# Patient Record
Sex: MALE | Race: WHITE | NOT HISPANIC OR LATINO | Employment: UNEMPLOYED | URBAN - METROPOLITAN AREA
[De-identification: names, ages, dates, MRNs, and addresses within clinical notes are randomized per-mention and may not be internally consistent; named-entity substitution may affect disease eponyms.]

---

## 2018-10-09 ENCOUNTER — APPOINTMENT (OUTPATIENT)
Dept: RADIOLOGY | Facility: MEDICAL CENTER | Age: 38
End: 2018-10-09
Attending: EMERGENCY MEDICINE

## 2018-10-09 ENCOUNTER — HOSPITAL ENCOUNTER (EMERGENCY)
Facility: MEDICAL CENTER | Age: 38
End: 2018-10-09
Attending: EMERGENCY MEDICINE

## 2018-10-09 VITALS
RESPIRATION RATE: 18 BRPM | OXYGEN SATURATION: 96 % | WEIGHT: 218.48 LBS | HEIGHT: 70 IN | DIASTOLIC BLOOD PRESSURE: 98 MMHG | SYSTOLIC BLOOD PRESSURE: 138 MMHG | BODY MASS INDEX: 31.28 KG/M2 | HEART RATE: 94 BPM | TEMPERATURE: 99.1 F

## 2018-10-09 DIAGNOSIS — S62.639A CLOSED FRACTURE OF TUFT OF DISTAL PHALANX OF FINGER: ICD-10-CM

## 2018-10-09 PROCEDURE — 73140 X-RAY EXAM OF FINGER(S): CPT | Mod: RT

## 2018-10-09 PROCEDURE — 99284 EMERGENCY DEPT VISIT MOD MDM: CPT

## 2018-10-09 PROCEDURE — 700102 HCHG RX REV CODE 250 W/ 637 OVERRIDE(OP): Performed by: EMERGENCY MEDICINE

## 2018-10-09 PROCEDURE — A9270 NON-COVERED ITEM OR SERVICE: HCPCS | Performed by: EMERGENCY MEDICINE

## 2018-10-09 RX ORDER — IBUPROFEN 600 MG/1
600 TABLET ORAL ONCE
Status: COMPLETED | OUTPATIENT
Start: 2018-10-09 | End: 2018-10-09

## 2018-10-09 RX ORDER — TRAMADOL HYDROCHLORIDE 50 MG/1
100 TABLET ORAL ONCE
Status: COMPLETED | OUTPATIENT
Start: 2018-10-09 | End: 2018-10-09

## 2018-10-09 RX ADMIN — TRAMADOL HYDROCHLORIDE 100 MG: 50 TABLET, COATED ORAL at 17:22

## 2018-10-09 RX ADMIN — IBUPROFEN 600 MG: 600 TABLET ORAL at 16:16

## 2018-10-09 ASSESSMENT — PAIN SCALES - GENERAL: PAINLEVEL_OUTOF10: 10

## 2018-10-09 NOTE — ED PROVIDER NOTES
ED Provider Note    CHIEF COMPLAINT   Chief Complaint   Patient presents with   • Digit Pain       HPI   Colt Correa is a 37 y.o. male who presents to the ED secondary to right middle finger pain.  The patient states he was using a crowbar, the crowbar went loose, he slammed the distal phalanx on his right middle finger.  He is right-handed, he does have a chronic burning of his right third and fourth distal fingers that he considers neuropathy but has not followed up with it.  Patient has sharp severe pain throughout the area.    Patient is also complaining of multiple other symptoms, increasing fatigue, malaise, thrush, oropharynx pain, burning in his mouth, neuropathy, his blood sugar was checked yesterday and was normal.  He does not have a primary care physician.    REVIEW OF SYSTEMS   See HPI for further details.     PAST MEDICAL HISTORY   Past Medical History:   Diagnosis Date   • ASTHMA    • GERD (gastroesophageal reflux disease)    • Methamphetamine abuse (HCC) 3/16/2013   • Tobacco use        FAMILY HISTORY  Family History   Problem Relation Age of Onset   • Heart Disease Other         early heart disease in his 30s   • Heart Disease Father    • Heart Disease Paternal Uncle          of early heart disease in his 30s       SOCIAL HISTORY  Social History     Social History   • Marital status: Single     Spouse name: N/A   • Number of children: N/A   • Years of education: N/A     Social History Main Topics   • Smoking status: Current Every Day Smoker     Packs/day: 0.50     Types: Cigarettes     Last attempt to quit: 2016   • Smokeless tobacco: Never Used   • Alcohol use Yes      Comment: daily 1-2 drinks   • Drug use: No      Comment: for about 5 years having quit in    • Sexual activity: Not on file     Other Topics Concern   • Not on file     Social History Narrative   • No narrative on file       SURGICAL HISTORY  Past Surgical History:   Procedure Laterality Date   • OTHER  "(COMMENTS)      sinus surgery   • TONSILLECTOMY AND ADENOIDECTOMY         CURRENT MEDICATIONS   Home Medications    **Home medications have not yet been reviewed for this encounter**         ALLERGIES   Allergies   Allergen Reactions   • Percocet [Oxycodone-Acetaminophen] Hives   • Benadryl [Altaryl]        PHYSICAL EXAM  VITAL SIGNS: /98   Pulse 94   Temp 37.3 °C (99.1 °F)   Resp 18   Ht 1.778 m (5' 10\")   Wt 99.1 kg (218 lb 7.6 oz)   SpO2 96%   BMI 31.35 kg/m²   Constitutional: Well developed, Well nourished, mild to moderate distress, Non-toxic appearance.   HENT:  Atraumatic, Normocephalic, Oral pharynx with moist mucous membranes.  Slight irritation along the tongue but no thrush, no oral pharyngeal lesions seen  Eyes: EOMI, PERRL  Cardiovascular: Good Pulses  Thorax & Lungs: No respiratory distress    Skin: Warm, Dry, No erythema, No rash.   Musculoskeletal: Patient has soft tissue swelling and ecchymosis over the pad of the right middle finger, decreased range of motion of the DIP joint, normal sensation, 2+ pulse.  Neurologic: Alert & oriented x 3,       RADIOLOGY/PROCEDURES  DX-FINGER(S) 2+ RIGHT   Final Result      Mildly displaced oblique fracture of RIGHT 3rd tuft, acute to subacute.            COURSE & MEDICAL DECISION MAKING  Pertinent Labs & Imaging studies reviewed. (See chart for details)  Patient smashed his finger will give the patient ibuprofen, check a x-ray.  Patient also has multiple other complaints, I offered to check some basic laboratory tests, the patient denies this at this point time, he wants an HIV test and thyroid tests.  Discussed with him the cost of this here in the emergency department for his chronic medical complaints, discussed with him he can go to the health department for an HIV test, recommend he follow-up with [facility] for further testing.    X-ray shows an oblique fracture, give the patient ibuprofen, the patient was adamant to get " additional pain medicines, I offered to perform a digital block on the patient.  Give the patient 2 tramadol here, he got upset by that, abdomen, long conversation with the patient the nurses had multiple conversations ultimately the patient was very upset that we did not give the patient narcotic pain medicines, reviewed the patient's medical record he does have a his admit history of methamphetamine abuse therefore I do not believe it is appropriate for the patient to receive narcotics.  Refer the patient to orthopedics, discussed with him to wear a splint, return with any concerns.        FINAL IMPRESSION  1. Closed fracture of tuft of distal phalanx of finger        Patient referred to primary care provider for blood pressure management     This dictation was created using voice recognition software. The accuracy of the dictation is limited to the abilities of the software. I expect there may be some errors of grammar and possibly content. The nursing notes were reviewed and certain aspects of this information were incorporated into this note.    Electronically signed by: Rj Alfredo, 10/9/2018 4:21 PM

## 2018-10-10 NOTE — ED NOTES
"This pt was encouraged to use the bathroom to clean his dirty hand of grease before the splint is applied. He was upset and asking for pain meds during this process. RN agreed to ask the ERP who did agree to give this pt one dose Prior To discharge. He was medicated per order. This pt continued to complain and asked if \"he was a woos needing pain meds for a broken finger\". He was adamant that he speak with the ERP to ask him why he can't have pain meds. Dr. Alfredo did speak with pt about a script for pain med. When he was not given this he did sign his paperwork and left refusing to put on splint. He marched out angry. He would not allow discharge vitals either. He used the F word several times to the RN and upon leaving the department. Security was close.  "

## 2018-10-10 NOTE — ED NOTES
Went to put splint on pt. Pt states that he doesn't think he will be able to tolerate the splint application without pain medicine. MD notified. MD states that pt can take tylenol and motrin at home. He will not be getting pain medications here and that if pt is apprehensive about the splint application, he can apply it at home. Fast triage nurse notified of this.

## 2019-07-26 ENCOUNTER — APPOINTMENT (OUTPATIENT)
Dept: RADIOLOGY | Facility: IMAGING CENTER | Age: 39
End: 2019-07-26
Attending: NURSE PRACTITIONER

## 2019-07-26 ENCOUNTER — OFFICE VISIT (OUTPATIENT)
Dept: OCCUPATIONAL MEDICINE | Facility: CLINIC | Age: 39
End: 2019-07-26

## 2019-07-26 ENCOUNTER — NON-PROVIDER VISIT (OUTPATIENT)
Dept: OCCUPATIONAL MEDICINE | Facility: CLINIC | Age: 39
End: 2019-07-26

## 2019-07-26 DIAGNOSIS — Z02.1 PRE-EMPLOYMENT HEALTH SCREENING EXAMINATION: ICD-10-CM

## 2019-07-26 PROCEDURE — 71045 X-RAY EXAM CHEST 1 VIEW: CPT | Mod: TC | Performed by: NURSE PRACTITIONER

## 2019-07-26 PROCEDURE — 92552 PURE TONE AUDIOMETRY AIR: CPT | Performed by: NURSE PRACTITIONER

## 2019-07-26 PROCEDURE — 8916 PR CLEARANCE ONLY: Performed by: NURSE PRACTITIONER

## 2019-07-26 PROCEDURE — 8915 PR COMPREHENSIVE PHYSICAL: Performed by: NURSE PRACTITIONER

## 2019-07-26 PROCEDURE — 8907 PR URINE COLLECT ONLY: Performed by: NURSE PRACTITIONER

## 2019-07-26 PROCEDURE — 8912 PR VISION SCREENING: Performed by: NURSE PRACTITIONER

## 2019-11-16 ENCOUNTER — APPOINTMENT (OUTPATIENT)
Dept: RADIOLOGY | Facility: MEDICAL CENTER | Age: 39
End: 2019-11-16
Attending: EMERGENCY MEDICINE

## 2019-11-16 ENCOUNTER — HOSPITAL ENCOUNTER (EMERGENCY)
Facility: MEDICAL CENTER | Age: 39
End: 2019-11-16
Attending: EMERGENCY MEDICINE

## 2019-11-16 VITALS
HEART RATE: 72 BPM | SYSTOLIC BLOOD PRESSURE: 154 MMHG | OXYGEN SATURATION: 99 % | WEIGHT: 220.46 LBS | RESPIRATION RATE: 18 BRPM | DIASTOLIC BLOOD PRESSURE: 111 MMHG | HEIGHT: 70 IN | BODY MASS INDEX: 31.56 KG/M2 | TEMPERATURE: 97.5 F

## 2019-11-16 LAB
ALBUMIN SERPL BCP-MCNC: 4.6 G/DL (ref 3.2–4.9)
ALBUMIN/GLOB SERPL: 1.9 G/DL
ALP SERPL-CCNC: 63 U/L (ref 30–99)
ALT SERPL-CCNC: 21 U/L (ref 2–50)
AMPHET UR QL SCN: POSITIVE
ANION GAP SERPL CALC-SCNC: 9 MMOL/L (ref 0–11.9)
APPEARANCE UR: ABNORMAL
AST SERPL-CCNC: 21 U/L (ref 12–45)
BACTERIA #/AREA URNS HPF: NEGATIVE /HPF
BARBITURATES UR QL SCN: NEGATIVE
BASOPHILS # BLD AUTO: 0.6 % (ref 0–1.8)
BASOPHILS # BLD: 0.08 K/UL (ref 0–0.12)
BENZODIAZ UR QL SCN: NEGATIVE
BILIRUB SERPL-MCNC: 1 MG/DL (ref 0.1–1.5)
BILIRUB UR QL STRIP.AUTO: NEGATIVE
BUN SERPL-MCNC: 18 MG/DL (ref 8–22)
BZE UR QL SCN: NEGATIVE
CALCIUM SERPL-MCNC: 9.5 MG/DL (ref 8.5–10.5)
CANNABINOIDS UR QL SCN: NEGATIVE
CHLORIDE SERPL-SCNC: 106 MMOL/L (ref 96–112)
CO2 SERPL-SCNC: 24 MMOL/L (ref 20–33)
COLOR UR: YELLOW
CREAT SERPL-MCNC: 0.9 MG/DL (ref 0.5–1.4)
EOSINOPHIL # BLD AUTO: 0.33 K/UL (ref 0–0.51)
EOSINOPHIL NFR BLD: 2.6 % (ref 0–6.9)
EPI CELLS #/AREA URNS HPF: NEGATIVE /HPF
ERYTHROCYTE [DISTWIDTH] IN BLOOD BY AUTOMATED COUNT: 42.2 FL (ref 35.9–50)
ETHANOL BLD-MCNC: 0 G/DL
GLOBULIN SER CALC-MCNC: 2.4 G/DL (ref 1.9–3.5)
GLUCOSE SERPL-MCNC: 92 MG/DL (ref 65–99)
GLUCOSE UR STRIP.AUTO-MCNC: NEGATIVE MG/DL
HCT VFR BLD AUTO: 44.6 % (ref 42–52)
HGB BLD-MCNC: 15 G/DL (ref 14–18)
HYALINE CASTS #/AREA URNS LPF: ABNORMAL /LPF
IMM GRANULOCYTES # BLD AUTO: 0.05 K/UL (ref 0–0.11)
IMM GRANULOCYTES NFR BLD AUTO: 0.4 % (ref 0–0.9)
KETONES UR STRIP.AUTO-MCNC: NEGATIVE MG/DL
LEUKOCYTE ESTERASE UR QL STRIP.AUTO: NEGATIVE
LIPASE SERPL-CCNC: 22 U/L (ref 11–82)
LYMPHOCYTES # BLD AUTO: 3.13 K/UL (ref 1–4.8)
LYMPHOCYTES NFR BLD: 24.6 % (ref 22–41)
MCH RBC QN AUTO: 32.1 PG (ref 27–33)
MCHC RBC AUTO-ENTMCNC: 33.6 G/DL (ref 33.7–35.3)
MCV RBC AUTO: 95.5 FL (ref 81.4–97.8)
METHADONE UR QL SCN: NEGATIVE
MICRO URNS: ABNORMAL
MONOCYTES # BLD AUTO: 0.81 K/UL (ref 0–0.85)
MONOCYTES NFR BLD AUTO: 6.4 % (ref 0–13.4)
NEUTROPHILS # BLD AUTO: 8.33 K/UL (ref 1.82–7.42)
NEUTROPHILS NFR BLD: 65.4 % (ref 44–72)
NITRITE UR QL STRIP.AUTO: NEGATIVE
NRBC # BLD AUTO: 0 K/UL
NRBC BLD-RTO: 0 /100 WBC
OPIATES UR QL SCN: NEGATIVE
OXYCODONE UR QL SCN: NEGATIVE
PCP UR QL SCN: NEGATIVE
PH UR STRIP.AUTO: 7.5 [PH] (ref 5–8)
PLATELET # BLD AUTO: 269 K/UL (ref 164–446)
PMV BLD AUTO: 10.3 FL (ref 9–12.9)
POTASSIUM SERPL-SCNC: 4.1 MMOL/L (ref 3.6–5.5)
PROPOXYPH UR QL SCN: NEGATIVE
PROT SERPL-MCNC: 7 G/DL (ref 6–8.2)
PROT UR QL STRIP: NEGATIVE MG/DL
RBC # BLD AUTO: 4.67 M/UL (ref 4.7–6.1)
RBC # URNS HPF: ABNORMAL /HPF
RBC UR QL AUTO: NEGATIVE
SODIUM SERPL-SCNC: 139 MMOL/L (ref 135–145)
SP GR UR STRIP.AUTO: 1.02
UROBILINOGEN UR STRIP.AUTO-MCNC: 0.2 MG/DL
WBC # BLD AUTO: 12.7 K/UL (ref 4.8–10.8)
WBC #/AREA URNS HPF: ABNORMAL /HPF

## 2019-11-16 PROCEDURE — 96374 THER/PROPH/DIAG INJ IV PUSH: CPT

## 2019-11-16 PROCEDURE — 85025 COMPLETE CBC W/AUTO DIFF WBC: CPT

## 2019-11-16 PROCEDURE — 80053 COMPREHEN METABOLIC PANEL: CPT

## 2019-11-16 PROCEDURE — 83690 ASSAY OF LIPASE: CPT

## 2019-11-16 PROCEDURE — 700111 HCHG RX REV CODE 636 W/ 250 OVERRIDE (IP): Performed by: EMERGENCY MEDICINE

## 2019-11-16 PROCEDURE — 99284 EMERGENCY DEPT VISIT MOD MDM: CPT

## 2019-11-16 PROCEDURE — 96375 TX/PRO/DX INJ NEW DRUG ADDON: CPT

## 2019-11-16 PROCEDURE — 81001 URINALYSIS AUTO W/SCOPE: CPT

## 2019-11-16 PROCEDURE — 80307 DRUG TEST PRSMV CHEM ANLYZR: CPT

## 2019-11-16 RX ORDER — ONDANSETRON 2 MG/ML
4 INJECTION INTRAMUSCULAR; INTRAVENOUS ONCE
Status: COMPLETED | OUTPATIENT
Start: 2019-11-16 | End: 2019-11-16

## 2019-11-16 RX ORDER — KETOROLAC TROMETHAMINE 30 MG/ML
30 INJECTION, SOLUTION INTRAMUSCULAR; INTRAVENOUS ONCE
Status: COMPLETED | OUTPATIENT
Start: 2019-11-16 | End: 2019-11-16

## 2019-11-16 RX ADMIN — KETOROLAC TROMETHAMINE 30 MG: 30 INJECTION, SOLUTION INTRAMUSCULAR at 10:59

## 2019-11-16 RX ADMIN — ONDANSETRON 4 MG: 2 INJECTION INTRAMUSCULAR; INTRAVENOUS at 11:00

## 2019-11-16 NOTE — ED NOTES
"Went to get pt for ct approximately 1 hour ago. Pt kept saying \"give me a moment\". Waited for 5 minutes. Pt continued to say the same thing. Told pt I would return. Upon return, pt was not in room. RN notified   "
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
## 2019-11-16 NOTE — ED NOTES
"Pt refused medications at this time - states \"I need 10 minutes of privacy\". Suggested medications and pt states \"not right now I just need 10 minutes\"  "

## 2020-07-11 ENCOUNTER — HOSPITAL ENCOUNTER (EMERGENCY)
Dept: HOSPITAL 8 - ED | Age: 40
Discharge: HOME | End: 2020-07-11
Payer: MEDICAID

## 2020-07-11 VITALS — DIASTOLIC BLOOD PRESSURE: 91 MMHG | SYSTOLIC BLOOD PRESSURE: 150 MMHG

## 2020-07-11 VITALS — WEIGHT: 220.46 LBS | BODY MASS INDEX: 31.56 KG/M2 | HEIGHT: 70 IN
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
## 2020-07-11 DIAGNOSIS — F17.210: ICD-10-CM
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
## 2020-07-11 DIAGNOSIS — S86.112A: Primary | ICD-10-CM

## 2020-07-11 LAB
ALBUMIN SERPL-MCNC: 3.6 G/DL (ref 3.4–5)
ANION GAP SERPL CALC-SCNC: 5 MMOL/L (ref 5–15)
BASOPHILS # BLD AUTO: 0.05 X10^3/UL (ref 0–0.1)
BASOPHILS NFR BLD AUTO: 0 % (ref 0–1)
CALCIUM SERPL-MCNC: 8.8 MG/DL (ref 8.5–10.1)
CHLORIDE SERPL-SCNC: 112 MMOL/L (ref 98–107)
CREAT SERPL-MCNC: 1.13 MG/DL (ref 0.7–1.3)
EOSINOPHIL # BLD AUTO: 0.62 X10^3/UL (ref 0–0.4)
EOSINOPHIL NFR BLD AUTO: 5 % (ref 1–7)
ERYTHROCYTE [DISTWIDTH] IN BLOOD BY AUTOMATED COUNT: 12.4 % (ref 9.4–14.8)
LYMPHOCYTES # BLD AUTO: 4.4 X10^3/UL (ref 1–3.4)
LYMPHOCYTES NFR BLD AUTO: 35 % (ref 22–44)
MCH RBC QN AUTO: 31.3 PG (ref 27.5–34.5)
MCHC RBC AUTO-ENTMCNC: 33.9 G/DL (ref 33.2–36.2)
MCV RBC AUTO: 92.5 FL (ref 81–97)
MD: NO
MONOCYTES # BLD AUTO: 0.88 X10^3/UL (ref 0.2–0.8)
MONOCYTES NFR BLD AUTO: 7 % (ref 2–9)
NEUTROPHILS # BLD AUTO: 6.48 X10^3/UL (ref 1.8–6.8)
NEUTROPHILS NFR BLD AUTO: 52 % (ref 42–75)
PLATELET # BLD AUTO: 333 X10^3/UL (ref 130–400)
PMV BLD AUTO: 8.3 FL (ref 7.4–10.4)
RBC # BLD AUTO: 4.33 X10^6/UL (ref 4.38–5.82)

## 2020-07-11 PROCEDURE — 82040 ASSAY OF SERUM ALBUMIN: CPT

## 2020-07-11 PROCEDURE — 80048 BASIC METABOLIC PNL TOTAL CA: CPT

## 2020-07-11 PROCEDURE — 99406 BEHAV CHNG SMOKING 3-10 MIN: CPT

## 2020-07-11 PROCEDURE — 36415 COLL VENOUS BLD VENIPUNCTURE: CPT

## 2020-07-11 PROCEDURE — 85025 COMPLETE CBC W/AUTO DIFF WBC: CPT

## 2020-07-11 PROCEDURE — 99284 EMERGENCY DEPT VISIT MOD MDM: CPT
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
## 2021-03-12 ENCOUNTER — TELEPHONE (OUTPATIENT)
Dept: HEALTH INFORMATION MANAGEMENT | Facility: OTHER | Age: 41
End: 2021-03-12

## 2021-04-20 ENCOUNTER — OFFICE VISIT (OUTPATIENT)
Dept: MEDICAL GROUP | Facility: MEDICAL CENTER | Age: 41
End: 2021-04-20
Attending: PHYSICIAN ASSISTANT
Payer: MEDICAID

## 2021-04-20 VITALS
RESPIRATION RATE: 16 BRPM | WEIGHT: 216.1 LBS | DIASTOLIC BLOOD PRESSURE: 58 MMHG | HEIGHT: 70 IN | HEART RATE: 78 BPM | BODY MASS INDEX: 30.94 KG/M2 | TEMPERATURE: 98.5 F | OXYGEN SATURATION: 97 % | SYSTOLIC BLOOD PRESSURE: 106 MMHG

## 2021-04-20 DIAGNOSIS — F15.11 METHAMPHETAMINE ABUSE IN REMISSION (HCC): ICD-10-CM

## 2021-04-20 DIAGNOSIS — K62.89 ANAL SPHINCTER INCOMPETENCE: ICD-10-CM

## 2021-04-20 DIAGNOSIS — D22.9 MULTIPLE ATYPICAL SKIN MOLES: ICD-10-CM

## 2021-04-20 DIAGNOSIS — Z00.00 HEALTHCARE MAINTENANCE: ICD-10-CM

## 2021-04-20 DIAGNOSIS — H91.91 HEARING LOSS OF RIGHT EAR, UNSPECIFIED HEARING LOSS TYPE: ICD-10-CM

## 2021-04-20 DIAGNOSIS — R07.89 OTHER CHEST PAIN: ICD-10-CM

## 2021-04-20 PROCEDURE — 99204 OFFICE O/P NEW MOD 45 MIN: CPT | Performed by: PHYSICIAN ASSISTANT

## 2021-04-20 PROCEDURE — 99203 OFFICE O/P NEW LOW 30 MIN: CPT | Performed by: PHYSICIAN ASSISTANT

## 2021-04-20 PROCEDURE — 99213 OFFICE O/P EST LOW 20 MIN: CPT | Performed by: PHYSICIAN ASSISTANT

## 2021-04-20 ASSESSMENT — FIBROSIS 4 INDEX: FIB4 SCORE: 0.7

## 2021-04-20 ASSESSMENT — PATIENT HEALTH QUESTIONNAIRE - PHQ9: CLINICAL INTERPRETATION OF PHQ2 SCORE: 0

## 2021-04-20 NOTE — ASSESSMENT & PLAN NOTE
Colt presents today establish care.  He reports a chronic history of chest pain that occurs at least 2 times per week. The pain is made worse with exercise or riding his bike. He has noticed associated mild bilateral lower leg edema. He denies any pain, sensory or motor function loss associated with this. He is a former methamphetamine user (for the past 12-14 years) and is concerned in regards to his heart health after many years of alcohol use and substance abuse. He denies any other symptoms associated with this, including SOB. Denies any red flag signs.

## 2021-04-20 NOTE — ASSESSMENT & PLAN NOTE
Colt presents today to establish care. He reports a history of alcohol abuse and methamphetamine use for 12-14 years. He has been in remission for 100 days now. He reports that he attends AA meetings twice daily which has been beneficial for him.
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
## 2021-04-21 ENCOUNTER — TELEPHONE (OUTPATIENT)
Dept: CARDIOLOGY | Facility: MEDICAL CENTER | Age: 41
End: 2021-04-21

## 2021-04-21 NOTE — TELEPHONE ENCOUNTER
Called patient to see if he has seen a cardiologist since seeing [facility] in 2016 to get records prior to upcoming appointment with [facility].     He denies any cardiac testing done outside of [facility]. Confirmed appointment date and time.

## 2021-05-03 ENCOUNTER — OFFICE VISIT (OUTPATIENT)
Dept: CARDIOLOGY | Facility: MEDICAL CENTER | Age: 41
End: 2021-05-03
Attending: PHYSICIAN ASSISTANT
Payer: MEDICAID

## 2021-05-03 VITALS
OXYGEN SATURATION: 97 % | WEIGHT: 215 LBS | HEART RATE: 68 BPM | HEIGHT: 70 IN | RESPIRATION RATE: 12 BRPM | DIASTOLIC BLOOD PRESSURE: 72 MMHG | BODY MASS INDEX: 30.78 KG/M2 | SYSTOLIC BLOOD PRESSURE: 108 MMHG

## 2021-05-03 DIAGNOSIS — Z72.0 TOBACCO USE: ICD-10-CM

## 2021-05-03 DIAGNOSIS — F15.11 METHAMPHETAMINE ABUSE IN REMISSION (HCC): ICD-10-CM

## 2021-05-03 DIAGNOSIS — R07.2 PRECORDIAL PAIN: ICD-10-CM

## 2021-05-03 DIAGNOSIS — Z82.49 FAMILY HISTORY OF CORONARY ARTERY DISEASE: ICD-10-CM

## 2021-05-03 LAB — EKG IMPRESSION: NORMAL

## 2021-05-03 PROCEDURE — 99406 BEHAV CHNG SMOKING 3-10 MIN: CPT | Performed by: INTERNAL MEDICINE

## 2021-05-03 PROCEDURE — 99204 OFFICE O/P NEW MOD 45 MIN: CPT | Mod: 25 | Performed by: INTERNAL MEDICINE

## 2021-05-03 PROCEDURE — 93000 ELECTROCARDIOGRAM COMPLETE: CPT | Performed by: INTERNAL MEDICINE

## 2021-05-03 ASSESSMENT — FIBROSIS 4 INDEX: FIB4 SCORE: 0.7

## 2021-05-03 NOTE — PROGRESS NOTES
"CARDIOLOGY OUTPATIENT FOLLOWUP    PCP: Una Geller P.A.-C.    1. Precordial pain    2. 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 Tobacco use    4. Family history of coronary artery disease        Colt Correa has typical chest pain limited to high level exertion as well as history of substance abuse.  I recommended an echocardiogram, stress test, laboratory evaluation as well as counseled him for greater than 3 minutes on tobacco cessation-which he is receptive to.    Follow up: to be determined after testing is complete    Chief Complaint   Patient presents with   • Chest Pain     F/V Dx: Other chest pain       History: Colt Correa is a 40 y.o. male with past medical history of methamphetamine and alcohol abuse as well as tobacco use sober for greater than the past 100 days.  He presents today for evaluation of 3 months of chest discomfort upon exertion.  He notices the sensation in the center of the chest during heavy mountain bike activities.  He has taken a break from the activity in favor of walking.  He has lost 10 pounds and hopes to get back on the mountain bike but is concerned about the cardiovascular safety.  He has numerous second-degree relatives with early coronary disease in their 30s and 40s.  He smokes cigarettes but is making efforts to quit.  We discussed his  journey to remain sober over the past 100+ days as well as the strength t/motivation hat he gets from his bert as well as his 4 children.  He continues to attend AA meetings regularly.  He does smoke cigarettes but is making an effort to quit.      ROS:   All other systems reviewed and negative except as per the HPI    PE:  /72 (BP Location: Left arm, Patient Position: Sitting, BP Cuff Size: Adult)   Pulse 68   Resp 12   Ht 1.778 m (5' 10\")   Wt 97.5 kg (215 lb)   SpO2 97%   BMI 30.85 kg/m²   Gen: Well appearing  HEENT: Symmetric face. Anicteric sclerae. Moist mucus membranes  NECK: No JVD. No " lymphadenopathy  CARDIAC: Normal PMI, regular, normal S1, S2. without murmur  VASCULATURE: Normal carotid amplitude without bruit.   RESP: Clear to auscultation bilaterally  ABD: Soft, non-tender, non-distended  EXT: No edema, no clubbing or cyanosis  SKIN: Warm and dry  NEURO: No gross deficits  PSYCH: Appropriate affect, participates in conversation    Past Medical History:   Diagnosis Date   • ASTHMA    • GERD (gastroesophageal reflux disease)    • Methamphetamine abuse ([facility]) 3/16/2013   • Tobacco use      Allergies   Allergen Reactions   • Percocet [Oxycodone-Acetaminophen] Hives   • Benadryl [Altaryl]      Outpatient Encounter Medications as of 5/3/2021   Medication Sig Dispense Refill   • [DISCONTINUED] famotidine (PEPCID AC) 10 MG chewable tablet Take 10 mg by mouth 2 times a day.       No facility-administered encounter medications on file as of 5/3/2021.     Social History     Socioeconomic History   • Marital status: Single     Spouse name: Not on file   • Number of children: Not on file   • Years of education: Not on file   • Highest education level: Not on file   Occupational History   • Not on file   Tobacco Use   • Smoking status: Current Some Day Smoker     Packs/day: 0.50     Types: Cigarettes   • Smokeless tobacco: Never Used   Substance and Sexual Activity   • Alcohol use: Not Currently   • Drug use: Not on file   • Sexual activity: Yes     Partners: Female   Other Topics Concern   • Not on file   Social History Narrative   • Not on file     Social Determinants of Health     Financial Resource Strain:    • Difficulty of Paying Living Expenses:    Food Insecurity:    • Worried About Running Out of Food in the Last Year:    • Ran Out of Food in the Last Year:    Transportation Needs:    • Lack of Transportation (Medical):    • Lack of Transportation (Non-Medical):    Physical Activity:    • Days of Exercise per Week:    • Minutes of Exercise per Session:    Stress:    • Feeling of Stress :    Social  Connections:    • Frequency of Communication with Friends and Family:    • Frequency of Social Gatherings with Friends and Family:    • Attends Mu-ism Services:    • Active Member of Clubs or Organizations:    • Attends Club or Organization Meetings:    • Marital Status:    Intimate Partner Violence:    • Fear of Current or Ex-Partner:    • Emotionally Abused:    • Physically Abused:    • Sexually Abused:        Studies  Lab Results   Component Value Date/Time    CHOLSTRLTOT 128 03/17/2013 01:20 AM    LDL 63 03/17/2013 01:20 AM    HDL 43 03/17/2013 01:20 AM    TRIGLYCERIDE 108 03/17/2013 01:20 AM       Lab Results   Component Value Date/Time    SODIUM 139 11/16/2019 10:03 AM    POTASSIUM 4.1 11/16/2019 10:03 AM    CHLORIDE 106 11/16/2019 10:03 AM    CO2 24 11/16/2019 10:03 AM    GLUCOSE 92 11/16/2019 10:03 AM    BUN 18 11/16/2019 10:03 AM    CREATININE 0.90 11/16/2019 10:03 AM    CREATININE 1.1 01/11/2009 03:30 AM     Lab Results   Component Value Date/Time    ALKPHOSPHAT 63 11/16/2019 10:03 AM    ASTSGOT 21 11/16/2019 10:03 AM    ALTSGPT 21 11/16/2019 10:03 AM    TBILIRUBIN 1.0 11/16/2019 10:03 AM        For this encounter I reviewed the following medical records PCP notes, BMP and Lipid profile   For this encounter I directly reviewed ECG tracings and stress test images. I agree with the interpretations in the electronic health record.

## 2021-12-22 ENCOUNTER — OFFICE VISIT (OUTPATIENT)
Dept: MEDICAL GROUP | Facility: MEDICAL CENTER | Age: 41
End: 2021-12-22
Attending: INTERNAL MEDICINE
Payer: MEDICAID

## 2021-12-22 VITALS
TEMPERATURE: 97.9 F | RESPIRATION RATE: 16 BRPM | DIASTOLIC BLOOD PRESSURE: 72 MMHG | HEIGHT: 70 IN | SYSTOLIC BLOOD PRESSURE: 112 MMHG | WEIGHT: 242.6 LBS | BODY MASS INDEX: 34.73 KG/M2 | OXYGEN SATURATION: 97 % | HEART RATE: 79 BPM

## 2021-12-22 DIAGNOSIS — N61.0 NIPPLE INFLAMMATION: ICD-10-CM

## 2021-12-22 DIAGNOSIS — N52.9 ERECTILE DYSFUNCTION, UNSPECIFIED ERECTILE DYSFUNCTION TYPE: ICD-10-CM

## 2021-12-22 DIAGNOSIS — R15.1 FECAL SMEARING: ICD-10-CM

## 2021-12-22 DIAGNOSIS — D22.9 MULTIPLE ATYPICAL SKIN MOLES: ICD-10-CM

## 2021-12-22 DIAGNOSIS — R13.10 DYSPHAGIA, UNSPECIFIED TYPE: ICD-10-CM

## 2021-12-22 PROCEDURE — 99213 OFFICE O/P EST LOW 20 MIN: CPT | Performed by: INTERNAL MEDICINE

## 2021-12-22 PROCEDURE — 99214 OFFICE O/P EST MOD 30 MIN: CPT | Performed by: INTERNAL MEDICINE

## 2021-12-22 NOTE — ASSESSMENT & PLAN NOTE
He states that he has difficulty swallowing unless he follows his food with a significant quantity of water.  If he takes several bites of food without drinking water he feels like it gets stuck in his throat.  Also reports worsening of his GERD symptoms with his recent 30 pound weight gain.

## 2021-12-22 NOTE — ASSESSMENT & PLAN NOTE
He was referred to dermatology back in April for numerous concerning skin moles.  He never followed up with this referral and is requesting a new one today.  He has so many moles that he has not been able to keep track of whether any have grown or changed.  He denies personal history of skin cancer.

## 2021-12-22 NOTE — ASSESSMENT & PLAN NOTE
He reports that for many years he has had difficulty after a bowel movement with leakage of feces/smearing.  States that he has to wipe throughout the day and sometimes has to put a liner in his underwear to prevent leakage.  He has never talked to any doctors about this.  He will occasionally have blood in his bowel movements if he has wiped quite a few times throughout the day.  He also reports that this is somewhat related to diet and worse when he drinks coffee.

## 2021-12-22 NOTE — PROGRESS NOTES
"Subjective:   Colt Correa is a 41 y.o. male here today for referrals, issues below    Nipple itching  Patient is requesting breast imaging.  States that his periareolar region has been itching on both sides for the past 6 months.  He denies any associated rash but does note dry skin in the area.  He has not tried any moisturizers.  States that he recently had a male friend diagnosed with breast cancer who his only symptom was itching in the nipple region so he is concerned.  He has not felt any lumps or bumps in the breast tissue and he denies any nipple discharge.    Erectile dysfunction  He is requesting testosterone testing.  In addition to rapid weight gain, he has noticed difficulty achieving a full erection.  He is still able to achieve \"three quarters of an erection\".    Fecal smearing  He reports that for many years he has had difficulty after a bowel movement with leakage of feces/smearing.  States that he has to wipe throughout the day and sometimes has to put a liner in his underwear to prevent leakage.  He has never talked to any doctors about this.  He will occasionally have blood in his bowel movements if he has wiped quite a few times throughout the day.  He also reports that this is somewhat related to diet and worse when he drinks coffee.    Dysphagia  He states that he has difficulty swallowing unless he follows his food with a significant quantity of water.  If he takes several bites of food without drinking water he feels like it gets stuck in his throat.  Also reports worsening of his GERD symptoms with his recent 30 pound weight gain.    Multiple atypical skin moles  He was referred to dermatology back in April for numerous concerning skin moles.  He never followed up with this referral and is requesting a new one today.  He has so many moles that he has not been able to keep track of whether any have grown or changed.  He denies personal history of skin cancer.         Current " medicines (including changes today)  No current outpatient medications on file.     No current facility-administered medications for this visit.     He  has a past medical history of ASTHMA, GERD (gastroesophageal reflux disease), Methamphetamine abuse (HCC) (3/16/2013), and Tobacco use. He also has no past medical history of CAD (coronary artery disease), Cancer ([facility]), Congestive heart failure (HCC), COPD, Diabetes, Hypertension, Infectious disease, Liver disease, Renal disorder, Seizure disorder (HCC), or Stroke ([facility]).    ROS   Denies chest pain, shortness of breath  As above in HPI     Objective:     Vitals:    12/22/21 1007   BP: 112/72   Pulse: 79   Resp: 16   Temp: 36.6 °C (97.9 °F)   SpO2: 97%     Body mass index is 34.81 kg/m².   Physical Exam:  Constitutional: Alert, no distress.  Skin: Warm, dry, good turgor, innumerable moles over torso/back, with many larger than 1 cm and varying degrees of pigmentation  Eye: Equal, round and reactive, conjunctiva clear, lids normal.  Psych: Alert and oriented x3, normal affect and mood.  Breast: no masses, nipples appear normal      Assessment and Plan:   The following treatment plan was discussed    1. Nipple itching  Discussed a trial of OTC moisturizer such as aquaphor or Eucerin BID x 14 days to see if improved.  If not, he will contact me via mychart.  Normal breast exam in clinic today is reassuring    2. Erectile dysfunction, unspecified erectile dysfunction type  - TESTOSTERONE SERUM; Future    3. Multiple atypical skin moles  - Referral to Dermatology    4. Fecal smearing  Would benefit from anoscopy/possible colonoscopy   - Referral to Gastroenterology    5. Dysphagia, unspecified type  May require EGD, suspect related to chronic irritation from GERD.  - Referral to Gastroenterology        Followup: Return if symptoms worsen or fail to improve.

## 2021-12-22 NOTE — ASSESSMENT & PLAN NOTE
"He is requesting testosterone testing.  In addition to rapid weight gain, he has noticed difficulty achieving a full erection.  He is still able to achieve \"three quarters of an erection\".  "

## 2021-12-22 NOTE — ASSESSMENT & PLAN NOTE
Patient is requesting breast imaging.  States that his periareolar region has been itching on both sides for the past 6 months.  He denies any associated rash but does note dry skin in the area.  He has not tried any moisturizers.  States that he recently had a male friend diagnosed with breast cancer who his only symptom was itching in the nipple region so he is concerned.  He has not felt any lumps or bumps in the breast tissue and he denies any nipple discharge.

## 2021-12-29 ENCOUNTER — TELEPHONE (OUTPATIENT)
Dept: MEDICAL GROUP | Facility: MEDICAL CENTER | Age: 41
End: 2021-12-29

## 2021-12-29 NOTE — TELEPHONE ENCOUNTER
I will print it out cause he mention he does not know he his password and he rather will like to come and pick it up.

## 2021-12-29 NOTE — TELEPHONE ENCOUNTER
Phone Number Called: [phone] (home)       Call outcome: Spoke to patient regarding message below.    Message: Spoke to patient , patient mention he had dispatch health test him for covid due to a bug and also patient mention he on probation and need a note from you stating is okay for him to take nyquil decongestant , patient mention since he on probation they made him to a urine drug screen to see if  everything was good and the officer said yes that he was allow to take Nyquil but needed a note from you staying it was okay.

## 2022-01-06 ENCOUNTER — OFFICE VISIT (OUTPATIENT)
Dept: URGENT CARE | Facility: CLINIC | Age: 42
End: 2022-01-06
Payer: COMMERCIAL

## 2022-01-06 VITALS
HEART RATE: 85 BPM | DIASTOLIC BLOOD PRESSURE: 80 MMHG | WEIGHT: 246 LBS | OXYGEN SATURATION: 97 % | TEMPERATURE: 98.6 F | BODY MASS INDEX: 35.22 KG/M2 | RESPIRATION RATE: 20 BRPM | HEIGHT: 70 IN | SYSTOLIC BLOOD PRESSURE: 124 MMHG

## 2022-01-06 DIAGNOSIS — S02.101S: ICD-10-CM

## 2022-01-06 DIAGNOSIS — J01.00 ACUTE NON-RECURRENT MAXILLARY SINUSITIS: ICD-10-CM

## 2022-01-06 PROCEDURE — 99214 OFFICE O/P EST MOD 30 MIN: CPT | Performed by: FAMILY MEDICINE

## 2022-01-06 RX ORDER — AMOXICILLIN AND CLAVULANATE POTASSIUM 875; 125 MG/1; MG/1
1 TABLET, FILM COATED ORAL 2 TIMES DAILY
Qty: 20 TABLET | Refills: 0 | Status: SHIPPED | OUTPATIENT
Start: 2022-01-06 | End: 2022-01-16

## 2022-01-07 NOTE — PROGRESS NOTES
CC:  cough       SINUSITIS  This is a new problem. The current episode started 7 days ago. The problem has been unchanged. The problem occurs constantly.  + productive cough.        Associated symptoms include : fatigue, headache, sinus congestion, nasal d/c,  Subj.  fever. Pertinent negatives include no    , nausea, vomiting, diarrhea, sweats, weight loss or wheezing. Nothing aggravates the symptoms.  Patient has tried nothing for the symptoms. There is no history of asthma.          #2.  He has remote hx of basiallar skull fracture as a child.   Reports that he has had progressively worse headaches and a bony overgrowth over rt mastoid area over past couple of years.           Past Medical History:   Diagnosis Date   • Methamphetamine abuse (HCC) 3/16/2013   • ASTHMA    • GERD (gastroesophageal reflux disease)    • Tobacco use          Social History     Socioeconomic History   • Marital status: Single     Spouse name: Not on file   • Number of children: Not on file   • Years of education: Not on file   • Highest education level: Not on file   Occupational History   • Not on file   Tobacco Use   • Smoking status: Current Some Day Smoker     Packs/day: 0.50     Types: Cigarettes   • Smokeless tobacco: Never Used   Vaping Use   • Vaping Use: Never used   Substance and Sexual Activity   • Alcohol use: Not Currently   • Drug use: Not on file   • Sexual activity: Yes     Partners: Female   Other Topics Concern   • Not on file   Social History Narrative   • Not on file     Social Determinants of Health     Financial Resource Strain:    • Difficulty of Paying Living Expenses: Not on file   Food Insecurity:    • Worried About Running Out of Food in the Last Year: Not on file   • Ran Out of Food in the Last Year: Not on file   Transportation Needs:    • Lack of Transportation (Medical): Not on file   • Lack of Transportation (Non-Medical): Not on file   Physical Activity:    • Days of Exercise per Week: Not on file   •  "Minutes of Exercise per Session: Not on file   Stress:    • Feeling of Stress : Not on file   Social Connections:    • Frequency of Communication with Friends and Family: Not on file   • Frequency of Social Gatherings with Friends and Family: Not on file   • Attends Druze Services: Not on file   • Active Member of Clubs or Organizations: Not on file   • Attends Club or Organization Meetings: Not on file   • Marital Status: Not on file   Intimate Partner Violence:    • Fear of Current or Ex-Partner: Not on file   • Emotionally Abused: Not on file   • Physically Abused: Not on file   • Sexually Abused: Not on file   Housing Stability:    • Unable to Pay for Housing in the Last Year: Not on file   • Number of Places Lived in the Last Year: Not on file   • Unstable Housing in the Last Year: Not on file                 Review of Systems   Constitutional: Negative for   chills and malaise/fatigue.   Eyes: Negative for vision changes, d/c.    Respiratory: + for cough and sputum production.    Cardiovascular: Negative for chest pain and palpitations.   Gastrointestinal: Negative for nausea, vomiting, abdominal pain, diarrhea and constipation.   Genitourinary: Negative for dysuria, urgency and frequency.   Skin: Negative for rash or  itching.   Neurological: Negative for dizziness and tingling.    Psychiatric/Behavioral: Negative for depression.   Hematologic/lymphatic - denies bruising or excessive bleeding  All other systems reviewed and are negative.                   Objective:     /80 (BP Location: Left arm, Patient Position: Sitting, BP Cuff Size: Large adult long)   Pulse 85   Temp 37 °C (98.6 °F) (Temporal)   Resp 20   Ht 1.778 m (5' 10\")   Wt 112 kg (246 lb)   SpO2 97%       Physical Exam   Constitutional: patient is oriented to person, place, and time. Patient appears well-developed and well-nourished. No distress.   HENT:   Head: Normocephalic and atraumatic.  There is a hard, bony nodule over rt " mastoid bone  Right Ear: External ear normal.   Left Ear: External ear normal.   Nose: Mucosal edema  present. + bilat sinus TTP        Mouth/Throat: Mucous membranes are normal. No oral lesions.  No posterior pharyngeal erythema.  No oropharyngeal exudate or posterior oropharyngeal edema.   Eyes: Conjunctivae and EOM are normal. Pupils are equal, round, and reactive to light. Right eye exhibits no discharge. Left eye exhibits no discharge. No scleral icterus.   Neck: Normal range of motion. Neck supple. No tracheal deviation present.   Cardiovascular: Normal rate, regular rhythm and normal heart sounds.  Exam reveals no friction rub.    Pulmonary/Chest: Effort normal. No respiratory distress. Patient has no wheezes or rhonchi. Patient has no rales.    Musculoskeletal:  exhibits no edema.   Lymphadenopathy:     Patient has no cervical adenopathy.      Neurological: patient is alert and oriented to person, place, and time.   Skin: Skin is warm and dry. No rash noted. No erythema.   Psychiatric: patient  has a normal mood and affect.  behavior is normal.   Nursing note and vitals reviewed.       A/P:      1. Closed fracture of right side of base of skull, sequela (HCC)     - Referral to Neurosurgery     2. Acute non-recurrent maxillary sinusitis    - amoxicillin-clavulanate (AUGMENTIN) 875-125 MG Tab; Take 1 Tablet by mouth 2 times a day for 10 days.  Dispense: 20 Tablet; Refill: 0        Follow up in one week if no improvement, sooner if symptoms worsen.

## 2022-03-05 ENCOUNTER — APPOINTMENT (OUTPATIENT)
Dept: RADIOLOGY | Facility: MEDICAL CENTER | Age: 42
End: 2022-03-05
Attending: EMERGENCY MEDICINE
Payer: COMMERCIAL

## 2022-03-05 ENCOUNTER — HOSPITAL ENCOUNTER (EMERGENCY)
Facility: MEDICAL CENTER | Age: 42
End: 2022-03-05
Attending: EMERGENCY MEDICINE
Payer: COMMERCIAL

## 2022-03-05 VITALS
BODY MASS INDEX: 35.98 KG/M2 | OXYGEN SATURATION: 97 % | HEIGHT: 69 IN | SYSTOLIC BLOOD PRESSURE: 154 MMHG | RESPIRATION RATE: 18 BRPM | WEIGHT: 242.95 LBS | DIASTOLIC BLOOD PRESSURE: 95 MMHG | TEMPERATURE: 97.9 F | HEART RATE: 87 BPM

## 2022-03-05 DIAGNOSIS — R21 RASH: ICD-10-CM

## 2022-03-05 DIAGNOSIS — R53.1 GENERALIZED WEAKNESS: ICD-10-CM

## 2022-03-05 DIAGNOSIS — R51.9 NONINTRACTABLE HEADACHE, UNSPECIFIED CHRONICITY PATTERN, UNSPECIFIED HEADACHE TYPE: ICD-10-CM

## 2022-03-05 DIAGNOSIS — R06.02 SHORTNESS OF BREATH: ICD-10-CM

## 2022-03-05 DIAGNOSIS — R53.81 MALAISE AND FATIGUE: ICD-10-CM

## 2022-03-05 DIAGNOSIS — M79.10 GENERALIZED MUSCLE ACHE: ICD-10-CM

## 2022-03-05 DIAGNOSIS — R53.83 MALAISE AND FATIGUE: ICD-10-CM

## 2022-03-05 LAB
ALBUMIN SERPL BCP-MCNC: 4.6 G/DL (ref 3.2–4.9)
ALBUMIN/GLOB SERPL: 1.8 G/DL
ALP SERPL-CCNC: 64 U/L (ref 30–99)
ALT SERPL-CCNC: 31 U/L (ref 2–50)
AMPHET UR QL SCN: POSITIVE
ANION GAP SERPL CALC-SCNC: 13 MMOL/L (ref 7–16)
APPEARANCE UR: CLEAR
AST SERPL-CCNC: 38 U/L (ref 12–45)
BARBITURATES UR QL SCN: NEGATIVE
BASOPHILS # BLD AUTO: 0.6 % (ref 0–1.8)
BASOPHILS # BLD: 0.07 K/UL (ref 0–0.12)
BENZODIAZ UR QL SCN: NEGATIVE
BILIRUB SERPL-MCNC: 0.5 MG/DL (ref 0.1–1.5)
BILIRUB UR QL STRIP.AUTO: NEGATIVE
BUN SERPL-MCNC: 14 MG/DL (ref 8–22)
BZE UR QL SCN: NEGATIVE
CA-I SERPL-SCNC: 1.2 MMOL/L (ref 1.1–1.3)
CALCIUM SERPL-MCNC: 9.1 MG/DL (ref 8.5–10.5)
CANNABINOIDS UR QL SCN: NEGATIVE
CHLORIDE SERPL-SCNC: 109 MMOL/L (ref 96–112)
CK SERPL-CCNC: 959 U/L (ref 0–154)
CO2 SERPL-SCNC: 21 MMOL/L (ref 20–33)
COLOR UR: YELLOW
CREAT SERPL-MCNC: 0.84 MG/DL (ref 0.5–1.4)
CRP SERPL HS-MCNC: 0.51 MG/DL (ref 0–0.75)
D DIMER PPP IA.FEU-MCNC: 0.3 UG/ML (FEU) (ref 0–0.5)
EKG IMPRESSION: NORMAL
EOSINOPHIL # BLD AUTO: 0.59 K/UL (ref 0–0.51)
EOSINOPHIL NFR BLD: 5.4 % (ref 0–6.9)
ERYTHROCYTE [DISTWIDTH] IN BLOOD BY AUTOMATED COUNT: 39.8 FL (ref 35.9–50)
ERYTHROCYTE [SEDIMENTATION RATE] IN BLOOD BY WESTERGREN METHOD: 8 MM/HOUR (ref 0–20)
GLOBULIN SER CALC-MCNC: 2.5 G/DL (ref 1.9–3.5)
GLUCOSE SERPL-MCNC: 108 MG/DL (ref 65–99)
GLUCOSE UR STRIP.AUTO-MCNC: NEGATIVE MG/DL
HCT VFR BLD AUTO: 39.5 % (ref 42–52)
HGB BLD-MCNC: 13.6 G/DL (ref 14–18)
IMM GRANULOCYTES # BLD AUTO: 0.04 K/UL (ref 0–0.11)
IMM GRANULOCYTES NFR BLD AUTO: 0.4 % (ref 0–0.9)
KETONES UR STRIP.AUTO-MCNC: ABNORMAL MG/DL
LEUKOCYTE ESTERASE UR QL STRIP.AUTO: NEGATIVE
LYMPHOCYTES # BLD AUTO: 3.67 K/UL (ref 1–4.8)
LYMPHOCYTES NFR BLD: 33.4 % (ref 22–41)
MAGNESIUM SERPL-MCNC: 2 MG/DL (ref 1.5–2.5)
MCH RBC QN AUTO: 31.3 PG (ref 27–33)
MCHC RBC AUTO-ENTMCNC: 34.4 G/DL (ref 33.7–35.3)
MCV RBC AUTO: 90.8 FL (ref 81.4–97.8)
METHADONE UR QL SCN: NEGATIVE
MICRO URNS: ABNORMAL
MONOCYTES # BLD AUTO: 0.83 K/UL (ref 0–0.85)
MONOCYTES NFR BLD AUTO: 7.6 % (ref 0–13.4)
NEUTROPHILS # BLD AUTO: 5.78 K/UL (ref 1.82–7.42)
NEUTROPHILS NFR BLD: 52.6 % (ref 44–72)
NITRITE UR QL STRIP.AUTO: NEGATIVE
NRBC # BLD AUTO: 0 K/UL
NRBC BLD-RTO: 0 /100 WBC
NT-PROBNP SERPL IA-MCNC: 23 PG/ML (ref 0–125)
OPIATES UR QL SCN: NEGATIVE
OXYCODONE UR QL SCN: NEGATIVE
PCP UR QL SCN: NEGATIVE
PH UR STRIP.AUTO: 6.5 [PH] (ref 5–8)
PLATELET # BLD AUTO: 293 K/UL (ref 164–446)
PMV BLD AUTO: 10 FL (ref 9–12.9)
POTASSIUM SERPL-SCNC: 3.8 MMOL/L (ref 3.6–5.5)
PROPOXYPH UR QL SCN: NEGATIVE
PROT SERPL-MCNC: 7.1 G/DL (ref 6–8.2)
PROT UR QL STRIP: NEGATIVE MG/DL
RBC # BLD AUTO: 4.35 M/UL (ref 4.7–6.1)
RBC UR QL AUTO: NEGATIVE
SODIUM SERPL-SCNC: 143 MMOL/L (ref 135–145)
SP GR UR STRIP.AUTO: 1.02
TROPONIN T SERPL-MCNC: 7 NG/L (ref 6–19)
TSH SERPL DL<=0.005 MIU/L-ACNC: 4.43 UIU/ML (ref 0.38–5.33)
UROBILINOGEN UR STRIP.AUTO-MCNC: 1 MG/DL
WBC # BLD AUTO: 11 K/UL (ref 4.8–10.8)

## 2022-03-05 PROCEDURE — 94760 N-INVAS EAR/PLS OXIMETRY 1: CPT

## 2022-03-05 PROCEDURE — 93005 ELECTROCARDIOGRAM TRACING: CPT | Performed by: EMERGENCY MEDICINE

## 2022-03-05 PROCEDURE — 93005 ELECTROCARDIOGRAM TRACING: CPT

## 2022-03-05 PROCEDURE — 84443 ASSAY THYROID STIM HORMONE: CPT

## 2022-03-05 PROCEDURE — 81003 URINALYSIS AUTO W/O SCOPE: CPT

## 2022-03-05 PROCEDURE — 84484 ASSAY OF TROPONIN QUANT: CPT

## 2022-03-05 PROCEDURE — 83880 ASSAY OF NATRIURETIC PEPTIDE: CPT

## 2022-03-05 PROCEDURE — 86140 C-REACTIVE PROTEIN: CPT

## 2022-03-05 PROCEDURE — 71045 X-RAY EXAM CHEST 1 VIEW: CPT

## 2022-03-05 PROCEDURE — 85652 RBC SED RATE AUTOMATED: CPT

## 2022-03-05 PROCEDURE — 36415 COLL VENOUS BLD VENIPUNCTURE: CPT

## 2022-03-05 PROCEDURE — 80053 COMPREHEN METABOLIC PANEL: CPT

## 2022-03-05 PROCEDURE — 85379 FIBRIN DEGRADATION QUANT: CPT

## 2022-03-05 PROCEDURE — 99284 EMERGENCY DEPT VISIT MOD MDM: CPT

## 2022-03-05 PROCEDURE — 82330 ASSAY OF CALCIUM: CPT

## 2022-03-05 PROCEDURE — 85025 COMPLETE CBC W/AUTO DIFF WBC: CPT

## 2022-03-05 PROCEDURE — 83735 ASSAY OF MAGNESIUM: CPT

## 2022-03-05 PROCEDURE — 82550 ASSAY OF CK (CPK): CPT

## 2022-03-05 PROCEDURE — 80307 DRUG TEST PRSMV CHEM ANLYZR: CPT

## 2022-03-05 ASSESSMENT — LIFESTYLE VARIABLES
EVER HAD A DRINK FIRST THING IN THE MORNING TO STEADY YOUR NERVES TO GET RID OF A HANGOVER: NO
DO YOU DRINK ALCOHOL: NO
EVER FELT BAD OR GUILTY ABOUT YOUR DRINKING: NO
HAVE YOU EVER FELT YOU SHOULD CUT DOWN ON YOUR DRINKING: NO
TOTAL SCORE: 0
CONSUMPTION TOTAL: INCOMPLETE
TOTAL SCORE: 0
TOTAL SCORE: 0
HAVE PEOPLE ANNOYED YOU BY CRITICIZING YOUR DRINKING: NO

## 2022-03-06 NOTE — ED PROVIDER NOTES
"ED Provider Note    CHIEF COMPLAINT  Chief Complaint   Patient presents with   • Fatigue     Extreme fatigue, patient reports weakness and tremors intermittently as well   • Rash     Things, calves and feet   • Shortness of Breath     Patient reports difficulty taking deep breaths        HPI    Primary care provider: Una Monge P.A.-C.   History obtained from: Patient  History limited by: None     Colt Correa is a 41 y.o. male who presents to the ED complaining of rash on the top of both feet that has spread to both thighs and also developing on the knuckles of both hands.  He reports that the rash started 5 days ago and he feels a burning sensation.  No known triggers.  He also reports severe fatigue and muscle soreness that has been intermittent since along with some tremors.  He states that he has headache and neck pain and also stiffness in his muscles and joints.  There has been no fever at home.  He states that he \"just started\" having a cough.  He reports shortness of breath with taking a deep breath.  He denies nausea/vomiting/diarrhea/dysuria.  No recent travels or known ill contacts.  Patient states that he has been feeling some lumps near his neck and is concerned about thyroid nodules.  He states that he was told that it may be something with his lymph nodes.  He states that he was to have some lab tests for his thyroid and also low testosterone level.  He states that he did some Google search and is concerned that he may have meningitis or cancer.  He also reports that traded in his bicycle for a car about 4 months ago and has gained 40 pounds since.  He states that he used to bicycle everywhere.  He also stopped smoking and reports that he does not use any alcohol or drugs.  He is not on any regular medications and denies significant past medical problems.    REVIEW OF SYSTEMS  Please see HPI for pertinent positives/negatives.  All other systems reviewed and are negative.     PAST " "MEDICAL HISTORY  Past Medical History:   Diagnosis Date   • Methamphetamine abuse (HCC) 3/16/2013   • ASTHMA    • GERD (gastroesophageal reflux disease)    • Tobacco use         SURGICAL HISTORY  Past Surgical History:   Procedure Laterality Date   • OTHER (COMMENTS)      sinus surgery   • TONSILLECTOMY AND ADENOIDECTOMY          SOCIAL HISTORY  Social History     Tobacco Use   • Smoking status: Current Some Day Smoker     Packs/day: 0.50     Types: Cigarettes   • Smokeless tobacco: Never Used   Vaping Use   • Vaping Use: Never used   Substance and Sexual Activity   • Alcohol use: Not Currently   • Drug use: Not on file   • Sexual activity: Yes     Partners: Female        FAMILY HISTORY  Family History   Problem Relation Age of Onset   • Heart Disease Other         early heart disease in his 30s   • Heart Disease Father    • Heart Disease Paternal Uncle          of early heart disease in his 30s        CURRENT MEDICATIONS  Home Medications     Reviewed by Lolis Swanson R.N. (Registered Nurse) on 22 at 1833  Med List Status: Partial   Medication Last Dose Status        Patient Feliz Taking any Medications                        ALLERGIES  Allergies   Allergen Reactions   • Percocet [Oxycodone-Acetaminophen] Hives   • Benadryl [Altaryl]         PHYSICAL EXAM  VITAL SIGNS: /95   Pulse 87   Temp 36.6 °C (97.9 °F) (Temporal)   Resp 18   Ht 1.753 m (5' 9\")   Wt 110 kg (242 lb 15.2 oz)   SpO2 97%   BMI 35.88 kg/m²  @MELONIE[[id number]::@     Pulse ox interpretation: 97% I interpret this pulse ox as normal     Cardiac monitor interpretation: Sinus rhythm with heart rate in the 80s as interpreted by me.  The patient presented with shortness of breath and cardiac monitor was ordered to monitor for dysrhythmia.    Constitutional: Well developed, well nourished, alert in no apparent distress, nontoxic appearance    HENT: No external signs of trauma, normocephalic, oropharynx moist and clear, no lesions " noted, no trismus/drooling/stridor, airway is widely patent and patient speaking with normal voice without difficulty, nose normal    Eyes: PERRL, conjunctiva without erythema, no discharge, no icterus    Neck: Soft and supple, trachea midline, no stridor, no tenderness, no LAD, no JVD, good ROM    Cardiovascular: Regular rate and rhythm, no murmurs/rubs/gallops, strong distal pulses and good perfusion    Thorax & Lungs: No respiratory distress, CTAB   Abdomen: Soft, nontender, nondistended, no guarding, no rebound, normal BS    Back: No CVAT    Extremities: No cyanosis, no gross deformity, good ROM, no tenderness, intact distal pulses with brisk cap refill    Skin: Warm, dry, no pallor/cyanosis, faint nondiscrete flesh toned and slightly red maculopapular rash on the top of both feet as well as bilateral thighs and perhaps mildly over the knuckles of both hands that blanches with pressure and without apparent tenderness to palpation, no warmth/crepitus/fluctuance/streaking/drainage, no petechiae/purpura/blisters/vesicles/ulceration, no rash noted on palms or soles, no mucosal involvement noted  Lymphatic: No lymphadenopathy noted    Neuro: A/O times 3, no focal deficits noted    Psychiatric: Cooperative, normal mood and affect, normal judgement, appropriate for clinical situation        DIAGNOSTIC STUDIES / PROCEDURES    EKG  12 Lead EKG obtained at 1838 and interpreted by me:   Rate: 79   Rhythm: Sinus rhythm   Ectopy: None  Intervals: Normal   Axis: Normal   QRS: Normal   ST segments: Minimal ST depression in inferior and lateral leads  T Waves: Normal    Clinical Impression: Sinus rhythm with nonspecific ST changes  Compared to May 3, 2021      LABS  All labs reviewed by me.     Results for orders placed or performed during the hospital encounter of 03/05/22   CBC with Differential   Result Value Ref Range    WBC 11.0 (H) 4.8 - 10.8 K/uL    RBC 4.35 (L) 4.70 - 6.10 M/uL    Hemoglobin 13.6 (L) 14.0 - 18.0 g/dL     Hematocrit 39.5 (L) 42.0 - 52.0 %    MCV 90.8 81.4 - 97.8 fL    MCH 31.3 27.0 - 33.0 pg    MCHC 34.4 33.7 - 35.3 g/dL    RDW 39.8 35.9 - 50.0 fL    Platelet Count 293 164 - 446 K/uL    MPV 10.0 9.0 - 12.9 fL    Neutrophils-Polys 52.60 44.00 - 72.00 %    Lymphocytes 33.40 22.00 - 41.00 %    Monocytes 7.60 0.00 - 13.40 %    Eosinophils 5.40 0.00 - 6.90 %    Basophils 0.60 0.00 - 1.80 %    Immature Granulocytes 0.40 0.00 - 0.90 %    Nucleated RBC 0.00 /100 WBC    Neutrophils (Absolute) 5.78 1.82 - 7.42 K/uL    Lymphs (Absolute) 3.67 1.00 - 4.80 K/uL    Monos (Absolute) 0.83 0.00 - 0.85 K/uL    Eos (Absolute) 0.59 (H) 0.00 - 0.51 K/uL    Baso (Absolute) 0.07 0.00 - 0.12 K/uL    Immature Granulocytes (abs) 0.04 0.00 - 0.11 K/uL    NRBC (Absolute) 0.00 K/uL   Comp Metabolic Panel   Result Value Ref Range    Sodium 143 135 - 145 mmol/L    Potassium 3.8 3.6 - 5.5 mmol/L    Chloride 109 96 - 112 mmol/L    Co2 21 20 - 33 mmol/L    Anion Gap 13.0 7.0 - 16.0    Glucose 108 (H) 65 - 99 mg/dL    Bun 14 8 - 22 mg/dL    Creatinine 0.84 0.50 - 1.40 mg/dL    Calcium 9.1 8.5 - 10.5 mg/dL    AST(SGOT) 38 12 - 45 U/L    ALT(SGPT) 31 2 - 50 U/L    Alkaline Phosphatase 64 30 - 99 U/L    Total Bilirubin 0.5 0.1 - 1.5 mg/dL    Albumin 4.6 3.2 - 4.9 g/dL    Total Protein 7.1 6.0 - 8.2 g/dL    Globulin 2.5 1.9 - 3.5 g/dL    A-G Ratio 1.8 g/dL   TSH WITH REFLEX TO FT4   Result Value Ref Range    TSH 4.430 0.380 - 5.330 uIU/mL   IONIZED CALCIUM   Result Value Ref Range    Ionized Calcium 1.2 1.1 - 1.3 mmol/L   Sed Rate   Result Value Ref Range    Sed Rate Westergren 8 0 - 20 mm/hour   URINALYSIS (UA)    Specimen: Urine, Clean Catch   Result Value Ref Range    Color Yellow     Character Clear     Specific Gravity 1.025 <1.035    Ph 6.5 5.0 - 8.0    Glucose Negative Negative mg/dL    Ketones Trace (A) Negative mg/dL    Protein Negative Negative mg/dL    Bilirubin Negative Negative    Urobilinogen, Urine 1.0 Negative    Nitrite Negative Negative     Leukocyte Esterase Negative Negative    Occult Blood Negative Negative    Micro Urine Req see below    TROPONIN   Result Value Ref Range    Troponin T 7 6 - 19 ng/L   CRP QUANTITIVE (NON-CARDIAC)   Result Value Ref Range    Stat C-Reactive Protein 0.51 0.00 - 0.75 mg/dL   MAGNESIUM   Result Value Ref Range    Magnesium 2.0 1.5 - 2.5 mg/dL   CREATINE KINASE   Result Value Ref Range    CPK Total 959 (H) 0 - 154 U/L   D-DIMER   Result Value Ref Range    D-Dimer Screen 0.30 0.00 - 0.50 ug/mL (FEU)   URINE DRUG SCREEN   Result Value Ref Range    Amphetamines Urine Positive (A) Negative    Barbiturates Negative Negative    Benzodiazepines Negative Negative    Cocaine Metabolite Negative Negative    Methadone Negative Negative    Opiates Negative Negative    Oxycodone Negative Negative    Phencyclidine -Pcp Negative Negative    Propoxyphene Negative Negative    Cannabinoid Metab Negative Negative   ESTIMATED GFR   Result Value Ref Range    GFR If African American >60 >60 mL/min/1.73 m 2    GFR If Non African American >60 >60 mL/min/1.73 m 2   proBrain Natriuretic Peptide, NT   Result Value Ref Range    NT-proBNP 23 0 - 125 pg/mL   EKG   Result Value Ref Range    Report       [facility] Emergency Dept.    Test Date:  2022  Pt Name:    DEBRA VORA               Department: ER  MRN:        [id number]                      Room:  Gender:     Male                         Technician: HRR  :        [id number]                   Requested By:ER TRIAGE PROTOCOL  Order #:    [id number]                    Reading MD:    Measurements  Intervals                                Axis  Rate:       79                           P:          47  NM:         140                          QRS:        43  QRSD:       88                           T:          30  QT:         372  QTc:        427    Interpretive Statements  SINUS RHYTHM  EARLY PRECORDIAL R/S TRANSITION  Compared to ECG 2021 08:21:25  No significant  changes          RADIOLOGY  The radiologist's interpretation of all radiological studies have been reviewed by me.     DX-CHEST-PORTABLE (1 VIEW)   Final Result         1. No acute cardiopulmonary abnormalities are identified.             COURSE & MEDICAL DECISION MAKING  Nursing notes, VS, PMSFHx reviewed in chart.     Review of past medical records shows the patient was seen in urgent care on January 6, 2022 for fatigue, headache, sinus congestion and nasal discharge and was prescribed Augmentin for acute sinusitis.      Differential diagnoses considered include but are not limited to: AMI, pericardial effusion/tamponade, pericarditis, CHF/pulm edema, PE, pneumothorax, pneumonia, pleural effusion, asthma, bronchospasm, bronchitis, respiratory infection, DKA, metabolic acidosis, anxiety/hyperventilation      History and physical exam as above.  EKG showed sinus rhythm with nonspecific ST changes with normal troponin.  This is unlikely to be ACS given the length of time of his symptoms without rise in troponin.  D-dimer was also negative as well and unlikely to be PE.  Symptomology does not suggest dissection or tamponade.  BNP without elevation and clinically without evidence for acute CHF.  No evidence for acute abnormality on chest x-ray.  Labs were significant for elevated CK without renal dysfunction.  Patient with positive amphetamine on drug screen.  Labs are otherwise fairly unremarkable and unrevealing.  Patient tolerated oral fluids without difficulty.  I was in the process of discharging the patient when I was informed by ED nurse that patient did not want to wait and left before receiving discharge instructions.  He was noted to be clinically stable during his ED stay.  His exam was benign.  His rash did not appear to be serious infection.  I do not suspect sepsis or meningitis.  I did talk to him earlier regarding outpatient follow-up given his multitude of symptoms for further evaluation.  He was also  noted to have elevated blood pressure readings for which he can follow-up on outpatient basis for further management.       The patient is referred to a primary physician for blood pressure management, diabetic screening, and for all other preventative health concerns.       FINAL IMPRESSION  1. Rash Acute   2. Malaise and fatigue Acute   3. Generalized weakness Acute   4. Generalized muscle ache Acute   5. Nonintractable headache, unspecified chronicity pattern, unspecified headache type Acute   6. Shortness of breath Acute          DISPOSITION  Patient will be discharged home in stable condition.       FOLLOW UP  Una Monge P.A.-C.  [address] [facility] [id number] [phone]    Call in 2 days      [facility], Emergency Dept  [address] [facility] [facility] [address] [phone]    If symptoms worsen         OUTPATIENT MEDICATIONS  There are no discharge medications for this patient.         Electronically signed by: Damir Gonzalez D.O., 3/5/2022 6:54 PM      Portions of this record were made with voice recognition software.  Despite my review, spelling/grammar/context errors may still remain.  Interpretation of this chart should be taken in this context.

## 2022-03-06 NOTE — ED TRIAGE NOTES
"Chief Complaint   Patient presents with   • Fatigue     Extreme fatigue, patient reports weakness and tremors intermittently as well   • Rash     Things, calves and feet   • Shortness of Breath     Patient reports difficulty taking deep breaths       42 yo male to triage for above complaint. Patient reports symptoms since Monday.  Protocol ordered. EKG complete.    Pt is alert and oriented, speaking in full sentences, follows commands and responds appropriately to questions.     Patient placed back in lobby and educated on triage process. Asked to inform RN of any changes.    /97   Pulse 88   Temp 36.6 °C (97.9 °F) (Temporal)   Resp 14   Ht 1.753 m (5' 9\")   Wt 110 kg (242 lb 15.2 oz)   SpO2 97%   BMI 35.88 kg/m²     "

## 2022-03-06 NOTE — ED NOTES
Pt is anxious and stating he wants to leave. Notified ERP. Pt began getting dressed, PIV removed due to risk of elopement.

## 2022-03-08 ENCOUNTER — APPOINTMENT (OUTPATIENT)
Dept: RADIOLOGY | Facility: MEDICAL CENTER | Age: 42
End: 2022-03-08
Attending: EMERGENCY MEDICINE
Payer: COMMERCIAL

## 2022-03-08 ENCOUNTER — HOSPITAL ENCOUNTER (EMERGENCY)
Facility: MEDICAL CENTER | Age: 42
End: 2022-03-08
Attending: EMERGENCY MEDICINE
Payer: COMMERCIAL

## 2022-03-08 VITALS
SYSTOLIC BLOOD PRESSURE: 170 MMHG | TEMPERATURE: 97.5 F | OXYGEN SATURATION: 98 % | HEIGHT: 70 IN | BODY MASS INDEX: 34.65 KG/M2 | HEART RATE: 93 BPM | RESPIRATION RATE: 18 BRPM | WEIGHT: 242 LBS | DIASTOLIC BLOOD PRESSURE: 111 MMHG

## 2022-03-08 DIAGNOSIS — F15.11 HISTORY OF AMPHETAMINE ABUSE (HCC): ICD-10-CM

## 2022-03-08 DIAGNOSIS — R35.0 URINARY FREQUENCY: ICD-10-CM

## 2022-03-08 DIAGNOSIS — R10.13 EPIGASTRIC PAIN: ICD-10-CM

## 2022-03-08 LAB
ALBUMIN SERPL BCP-MCNC: 4.6 G/DL (ref 3.2–4.9)
ALBUMIN/GLOB SERPL: 1.8 G/DL
ALP SERPL-CCNC: 69 U/L (ref 30–99)
ALT SERPL-CCNC: 23 U/L (ref 2–50)
ANION GAP SERPL CALC-SCNC: 14 MMOL/L (ref 7–16)
APPEARANCE UR: CLEAR
AST SERPL-CCNC: 18 U/L (ref 12–45)
BACTERIA #/AREA URNS HPF: NEGATIVE /HPF
BASOPHILS # BLD AUTO: 0.6 % (ref 0–1.8)
BASOPHILS # BLD: 0.08 K/UL (ref 0–0.12)
BILIRUB SERPL-MCNC: 0.7 MG/DL (ref 0.1–1.5)
BILIRUB UR QL STRIP.AUTO: NEGATIVE
BUN SERPL-MCNC: 12 MG/DL (ref 8–22)
CALCIUM SERPL-MCNC: 9.4 MG/DL (ref 8.5–10.5)
CHLORIDE SERPL-SCNC: 104 MMOL/L (ref 96–112)
CO2 SERPL-SCNC: 21 MMOL/L (ref 20–33)
COLOR UR: YELLOW
CREAT SERPL-MCNC: 0.84 MG/DL (ref 0.5–1.4)
EOSINOPHIL # BLD AUTO: 0.43 K/UL (ref 0–0.51)
EOSINOPHIL NFR BLD: 3 % (ref 0–6.9)
EPI CELLS #/AREA URNS HPF: NEGATIVE /HPF
ERYTHROCYTE [DISTWIDTH] IN BLOOD BY AUTOMATED COUNT: 37.9 FL (ref 35.9–50)
ETHANOL BLD-MCNC: <10.1 MG/DL (ref 0–10)
GLOBULIN SER CALC-MCNC: 2.6 G/DL (ref 1.9–3.5)
GLUCOSE SERPL-MCNC: 87 MG/DL (ref 65–99)
GLUCOSE UR STRIP.AUTO-MCNC: NEGATIVE MG/DL
HCT VFR BLD AUTO: 41.6 % (ref 42–52)
HGB BLD-MCNC: 14.9 G/DL (ref 14–18)
HYALINE CASTS #/AREA URNS LPF: ABNORMAL /LPF
IMM GRANULOCYTES # BLD AUTO: 0.04 K/UL (ref 0–0.11)
IMM GRANULOCYTES NFR BLD AUTO: 0.3 % (ref 0–0.9)
KETONES UR STRIP.AUTO-MCNC: 80 MG/DL
LEUKOCYTE ESTERASE UR QL STRIP.AUTO: NEGATIVE
LIPASE SERPL-CCNC: 23 U/L (ref 11–82)
LYMPHOCYTES # BLD AUTO: 4.32 K/UL (ref 1–4.8)
LYMPHOCYTES NFR BLD: 30.4 % (ref 22–41)
MCH RBC QN AUTO: 31.8 PG (ref 27–33)
MCHC RBC AUTO-ENTMCNC: 35.8 G/DL (ref 33.7–35.3)
MCV RBC AUTO: 88.7 FL (ref 81.4–97.8)
MICRO URNS: ABNORMAL
MONOCYTES # BLD AUTO: 0.94 K/UL (ref 0–0.85)
MONOCYTES NFR BLD AUTO: 6.6 % (ref 0–13.4)
NEUTROPHILS # BLD AUTO: 8.42 K/UL (ref 1.82–7.42)
NEUTROPHILS NFR BLD: 59.1 % (ref 44–72)
NITRITE UR QL STRIP.AUTO: NEGATIVE
NRBC # BLD AUTO: 0 K/UL
NRBC BLD-RTO: 0 /100 WBC
PH UR STRIP.AUTO: 5.5 [PH] (ref 5–8)
PLATELET # BLD AUTO: 322 K/UL (ref 164–446)
PMV BLD AUTO: 9.9 FL (ref 9–12.9)
POTASSIUM SERPL-SCNC: 3.7 MMOL/L (ref 3.6–5.5)
PROT SERPL-MCNC: 7.2 G/DL (ref 6–8.2)
PROT UR QL STRIP: NEGATIVE MG/DL
RBC # BLD AUTO: 4.69 M/UL (ref 4.7–6.1)
RBC # URNS HPF: ABNORMAL /HPF
RBC UR QL AUTO: ABNORMAL
SODIUM SERPL-SCNC: 139 MMOL/L (ref 135–145)
SP GR UR STRIP.AUTO: 1.02
UROBILINOGEN UR STRIP.AUTO-MCNC: 0.2 MG/DL
WBC # BLD AUTO: 14.2 K/UL (ref 4.8–10.8)
WBC #/AREA URNS HPF: ABNORMAL /HPF

## 2022-03-08 PROCEDURE — 82077 ASSAY SPEC XCP UR&BREATH IA: CPT

## 2022-03-08 PROCEDURE — 83690 ASSAY OF LIPASE: CPT

## 2022-03-08 PROCEDURE — 36415 COLL VENOUS BLD VENIPUNCTURE: CPT

## 2022-03-08 PROCEDURE — 80053 COMPREHEN METABOLIC PANEL: CPT

## 2022-03-08 PROCEDURE — 81001 URINALYSIS AUTO W/SCOPE: CPT

## 2022-03-08 PROCEDURE — 85025 COMPLETE CBC W/AUTO DIFF WBC: CPT

## 2022-03-08 PROCEDURE — 700102 HCHG RX REV CODE 250 W/ 637 OVERRIDE(OP): Performed by: EMERGENCY MEDICINE

## 2022-03-08 PROCEDURE — A9270 NON-COVERED ITEM OR SERVICE: HCPCS | Performed by: EMERGENCY MEDICINE

## 2022-03-08 PROCEDURE — 99284 EMERGENCY DEPT VISIT MOD MDM: CPT

## 2022-03-08 RX ADMIN — LIDOCAINE HYDROCHLORIDE 30 ML: 20 SOLUTION OROPHARYNGEAL at 22:23

## 2022-03-08 ASSESSMENT — FIBROSIS 4 INDEX: FIB4 SCORE: 0.98

## 2022-03-09 NOTE — ED PROVIDER NOTES
"ED Provider Note    CHIEF COMPLAINT  Chief Complaint   Patient presents with   • Other     States \"my legs have radiation poisoning\". Also states \"my kidneys feel like rocks, I'm going to guess it is kidney failure\". Pt reports fatigue, muscle cramping and joint pain.        HPI  Colt Correa is a 41 y.o. male who presents to the emergency department with multiple complaints.  The patient states that he has been peeing a lot his lower back and it hurts he is got upper epigastric discomfort he denies chest pain or shortness of breath but he just feels his muscles are cramping and he is got some fatigue.  The patient does have quite a bit of an odd affect he does endorse has been using amphetamines but states that he is not taking any indications at home beyond the amphetamines.  He states it hurts when he eats and drinks but denies any vomiting    REVIEW OF SYSTEMS  Positives as above. Pertinent negatives include diarrhea dysuria hematuria chest pain shortness of breath vomiting headache neck stiffness fevers otherwise limited  All other review of systems are negative    PAST MEDICAL HISTORY   has a past medical history of ASTHMA, GERD (gastroesophageal reflux disease), Methamphetamine abuse (HCC) (3/16/2013), and Tobacco use.    SOCIAL HISTORY  Social History     Tobacco Use   • Smoking status: Current Some Day Smoker     Packs/day: 0.50     Types: Cigarettes   • Smokeless tobacco: Never Used   Vaping Use   • Vaping Use: Never used   Substance and Sexual Activity   • Alcohol use: Not Currently   • Drug use: Not Currently     Types: Inhaled     Comment: hx meth use   • Sexual activity: Yes     Partners: Female       SURGICAL HISTORY   has a past surgical history that includes tonsillectomy and adenoidectomy and other (comments).    CURRENT MEDICATIONS  Home Medications     Reviewed by Renetta Toscano R.N. (Registered Nurse) on 03/08/22 at 2009  Med List Status: Partial   Medication Last Dose Status      " "  Patient Feliz Taking any Medications                       ALLERGIES  Allergies   Allergen Reactions   • Percocet [Oxycodone-Acetaminophen] Hives   • Benadryl [Altaryl]        PHYSICAL EXAM  VITAL SIGNS: BP (!) 170/111   Pulse 93   Temp 36.4 °C (97.5 °F) (Oral)   Resp 18   Ht 1.778 m (5' 10\")   Wt 110 kg (242 lb)   SpO2 98%   BMI 34.72 kg/m²    Pulse ox interpretation: I interpret this pulse ox as normal.  Constitutional: Alert in no apparent distress.  HENT: Normocephalic atraumatic, mildly dry mucous membranes  Eyes: PER, Conjunctiva normal, Non-icteric.   Neck: Normal range of motion, No tenderness, Supple, No stridor.   Cardiovascular: Regular rate and rhythm, no murmurs.   Thorax & Lungs: Normal breath sounds, No respiratory distress, No wheezing, No chest tenderness.   Abdomen: Bowel sounds normal, Soft, mild epigastric and right upper quadrant discomfort on exam, No pulsatile masses. No peritoneal signs.  Skin: Warm, Dry, No erythema, No rash.   Back: No bony tenderness, No CVA tenderness.   Extremities/MSK: Intact equal distal pulses, No edema, No tenderness, No cyanosis, no major deformities noted  Neurologic: Alert and oriented x3, No focal deficits noted.       DIFFERENTIAL DIAGNOSIS AND WORK UP PLAN    This is a 41 y.o. male who presents with multiple generalized complaints including urinary frequency will evaluate for hyperglycemia although laboratory  analysis earlier this week did not reveal any evidence of hyperglycemia and does not have a history of diabetes will perform urinalysis for signs of dehydration he does be dry on my exam epigastric comfort could be alcohol intoxication with pancreatitis GERD reflux or gallstone disorder    DIAGNOSTIC STUDIES / PROCEDURES    EKG      LABS  Pertinent Lab Findings    Labs Reviewed   CBC WITH DIFFERENTIAL - Abnormal; Notable for the following components:       Result Value    WBC 14.2 (*)     RBC 4.69 (*)     Hematocrit 41.6 (*)     MCHC 35.8 (*)     " Neutrophils (Absolute) 8.42 (*)     Monos (Absolute) 0.94 (*)     All other components within normal limits   URINALYSIS - Abnormal; Notable for the following components:    Ketones 80 (*)     Occult Blood Trace (*)     All other components within normal limits   URINE MICROSCOPIC (W/UA) - Abnormal; Notable for the following components:    WBC 0-2 (*)     RBC 2-5 (*)     Hyaline Cast 3-5 (*)     All other components within normal limits   COMP METABOLIC PANEL   DIAGNOSTIC ALCOHOL   LIPASE   ESTIMATED GFR       RADIOLOGY  No orders to display     The radiologist's interpretation of all radiological studies have been reviewed by me.      COURSE & MEDICAL DECISION MAKING  Pertinent Labs & Imaging studies reviewed. (See chart for details)    10:20 PM  I reassessed patient at the bedside visit mildly elevated blood cell count he feels better after the GI cocktail he does appear mildly dehydrated I offered him oral rehydration but I like to wait until we do an ultrasound right upper quadrant he continues a little bit discomfort in my examination.  Normal CMP normal alcohol normal lipase.    10:54 PM  Unfortunately the patient ELOPED before his ultrasound could be performed he just told the nurse he was leaving and walked out of the department      I verified that the patient was wearing a mask and I was wearing appropriate PPE every time I entered the room. The patient's mask was on the patient at all times during my encounter except for a brief view of the oropharynx.          FINAL IMPRESSION  1. Epigastric pain     2. Urinary frequency     3. History of amphetamine abuse (HCC)             Electronically signed by: Sonya Oden M.D., 3/8/2022 9:18 PM    This dictation has been created using voice recognition software and/or scribes. The accuracy of the dictation is limited by the abilities of the software and the expertise of the scribes. I expect there may be some errors of grammar and possibly content. I made  every attempt to manually correct the errors within my dictation. However, errors related to voice recognition software and/or scribes may still exist and should be interpreted within the appropriate context.

## 2022-03-09 NOTE — ED TRIAGE NOTES
"Colt Correa  41 y.o. male  Chief Complaint   Patient presents with   • Other     States \"my legs have radiation poisoning\". Also states \"my kidneys feel like rocks, I'm going to guess it is kidney failure\". Pt reports fatigue, muscle cramping and joint pain.      Pt WC to triage with friend for above. Pt talking about seeing \"ketones\". Pt seems to be delusional in thinking.     Pt A&Ox4, GCS 15. NAD. Denies pmhx, states does not take any home meds.    States used meth a week ago.     Triage process explained to patient, apologized for wait time, and returned to lobby.  Pt informed to notify staff of any change in condition.   "

## 2022-03-09 NOTE — ED NOTES
PT walked back from [facility] no assistance required. Changed into a gown and put on the monitor.

## 2022-03-25 ENCOUNTER — APPOINTMENT (OUTPATIENT)
Dept: RADIOLOGY | Facility: MEDICAL CENTER | Age: 42
End: 2022-03-25
Attending: EMERGENCY MEDICINE
Payer: COMMERCIAL

## 2022-03-25 ENCOUNTER — HOSPITAL ENCOUNTER (EMERGENCY)
Facility: MEDICAL CENTER | Age: 42
End: 2022-03-25
Attending: EMERGENCY MEDICINE
Payer: COMMERCIAL

## 2022-03-25 VITALS
TEMPERATURE: 97.4 F | HEIGHT: 70 IN | DIASTOLIC BLOOD PRESSURE: 68 MMHG | OXYGEN SATURATION: 93 % | WEIGHT: 233.69 LBS | HEART RATE: 91 BPM | RESPIRATION RATE: 18 BRPM | SYSTOLIC BLOOD PRESSURE: 118 MMHG | BODY MASS INDEX: 33.46 KG/M2

## 2022-03-25 DIAGNOSIS — F19.10 POLYSUBSTANCE ABUSE (HCC): ICD-10-CM

## 2022-03-25 DIAGNOSIS — R53.81 MALAISE AND FATIGUE: ICD-10-CM

## 2022-03-25 DIAGNOSIS — R53.83 MALAISE AND FATIGUE: ICD-10-CM

## 2022-03-25 DIAGNOSIS — R10.9 BILATERAL FLANK PAIN: ICD-10-CM

## 2022-03-25 LAB
ALBUMIN SERPL BCP-MCNC: 4.4 G/DL (ref 3.2–4.9)
ALBUMIN/GLOB SERPL: 2 G/DL
ALP SERPL-CCNC: 62 U/L (ref 30–99)
ALT SERPL-CCNC: 14 U/L (ref 2–50)
ANION GAP SERPL CALC-SCNC: 9 MMOL/L (ref 7–16)
APPEARANCE UR: CLEAR
AST SERPL-CCNC: 14 U/L (ref 12–45)
BASOPHILS # BLD AUTO: 0.7 % (ref 0–1.8)
BASOPHILS # BLD: 0.08 K/UL (ref 0–0.12)
BILIRUB SERPL-MCNC: 0.7 MG/DL (ref 0.1–1.5)
BILIRUB UR QL STRIP.AUTO: NEGATIVE
BUN SERPL-MCNC: 14 MG/DL (ref 8–22)
CALCIUM SERPL-MCNC: 9.3 MG/DL (ref 8.5–10.5)
CHLORIDE SERPL-SCNC: 106 MMOL/L (ref 96–112)
CK SERPL-CCNC: 114 U/L (ref 0–154)
CO2 SERPL-SCNC: 25 MMOL/L (ref 20–33)
COLOR UR: YELLOW
CREAT SERPL-MCNC: 1.03 MG/DL (ref 0.5–1.4)
EKG IMPRESSION: NORMAL
EOSINOPHIL # BLD AUTO: 0.31 K/UL (ref 0–0.51)
EOSINOPHIL NFR BLD: 2.6 % (ref 0–6.9)
ERYTHROCYTE [DISTWIDTH] IN BLOOD BY AUTOMATED COUNT: 42.3 FL (ref 35.9–50)
GFR SERPLBLD CREATININE-BSD FMLA CKD-EPI: 93 ML/MIN/1.73 M 2
GLOBULIN SER CALC-MCNC: 2.2 G/DL (ref 1.9–3.5)
GLUCOSE SERPL-MCNC: 101 MG/DL (ref 65–99)
GLUCOSE UR STRIP.AUTO-MCNC: NEGATIVE MG/DL
HCT VFR BLD AUTO: 41.7 % (ref 42–52)
HGB BLD-MCNC: 13.9 G/DL (ref 14–18)
IMM GRANULOCYTES # BLD AUTO: 0.05 K/UL (ref 0–0.11)
IMM GRANULOCYTES NFR BLD AUTO: 0.4 % (ref 0–0.9)
KETONES UR STRIP.AUTO-MCNC: 15 MG/DL
LEUKOCYTE ESTERASE UR QL STRIP.AUTO: NEGATIVE
LIPASE SERPL-CCNC: 28 U/L (ref 11–82)
LYMPHOCYTES # BLD AUTO: 3.12 K/UL (ref 1–4.8)
LYMPHOCYTES NFR BLD: 26.3 % (ref 22–41)
MCH RBC QN AUTO: 31.2 PG (ref 27–33)
MCHC RBC AUTO-ENTMCNC: 33.3 G/DL (ref 33.7–35.3)
MCV RBC AUTO: 93.7 FL (ref 81.4–97.8)
MICRO URNS: ABNORMAL
MONOCYTES # BLD AUTO: 0.75 K/UL (ref 0–0.85)
MONOCYTES NFR BLD AUTO: 6.3 % (ref 0–13.4)
NEUTROPHILS # BLD AUTO: 7.57 K/UL (ref 1.82–7.42)
NEUTROPHILS NFR BLD: 63.7 % (ref 44–72)
NITRITE UR QL STRIP.AUTO: NEGATIVE
NRBC # BLD AUTO: 0 K/UL
NRBC BLD-RTO: 0 /100 WBC
NT-PROBNP SERPL IA-MCNC: <5 PG/ML (ref 0–125)
PH UR STRIP.AUTO: 5.5 [PH] (ref 5–8)
PLATELET # BLD AUTO: 316 K/UL (ref 164–446)
PMV BLD AUTO: 9.8 FL (ref 9–12.9)
POTASSIUM SERPL-SCNC: 4.5 MMOL/L (ref 3.6–5.5)
PROT SERPL-MCNC: 6.6 G/DL (ref 6–8.2)
PROT UR QL STRIP: NEGATIVE MG/DL
RBC # BLD AUTO: 4.45 M/UL (ref 4.7–6.1)
RBC UR QL AUTO: NEGATIVE
SODIUM SERPL-SCNC: 140 MMOL/L (ref 135–145)
SP GR UR STRIP.AUTO: 1.02
TROPONIN T SERPL-MCNC: 7 NG/L (ref 6–19)
UROBILINOGEN UR STRIP.AUTO-MCNC: 0.2 MG/DL
WBC # BLD AUTO: 11.9 K/UL (ref 4.8–10.8)

## 2022-03-25 PROCEDURE — 71045 X-RAY EXAM CHEST 1 VIEW: CPT

## 2022-03-25 PROCEDURE — 81003 URINALYSIS AUTO W/O SCOPE: CPT

## 2022-03-25 PROCEDURE — 85025 COMPLETE CBC W/AUTO DIFF WBC: CPT

## 2022-03-25 PROCEDURE — 36415 COLL VENOUS BLD VENIPUNCTURE: CPT

## 2022-03-25 PROCEDURE — 96374 THER/PROPH/DIAG INJ IV PUSH: CPT

## 2022-03-25 PROCEDURE — 93005 ELECTROCARDIOGRAM TRACING: CPT | Performed by: EMERGENCY MEDICINE

## 2022-03-25 PROCEDURE — 74176 CT ABD & PELVIS W/O CONTRAST: CPT

## 2022-03-25 PROCEDURE — 84484 ASSAY OF TROPONIN QUANT: CPT

## 2022-03-25 PROCEDURE — 99285 EMERGENCY DEPT VISIT HI MDM: CPT

## 2022-03-25 PROCEDURE — 80053 COMPREHEN METABOLIC PANEL: CPT

## 2022-03-25 PROCEDURE — 82550 ASSAY OF CK (CPK): CPT

## 2022-03-25 PROCEDURE — 83690 ASSAY OF LIPASE: CPT

## 2022-03-25 PROCEDURE — 700111 HCHG RX REV CODE 636 W/ 250 OVERRIDE (IP): Performed by: EMERGENCY MEDICINE

## 2022-03-25 PROCEDURE — 83880 ASSAY OF NATRIURETIC PEPTIDE: CPT

## 2022-03-25 PROCEDURE — 94760 N-INVAS EAR/PLS OXIMETRY 1: CPT

## 2022-03-25 RX ORDER — KETOROLAC TROMETHAMINE 30 MG/ML
30 INJECTION, SOLUTION INTRAMUSCULAR; INTRAVENOUS ONCE
Status: COMPLETED | OUTPATIENT
Start: 2022-03-25 | End: 2022-03-25

## 2022-03-25 RX ADMIN — KETOROLAC TROMETHAMINE 30 MG: 30 INJECTION, SOLUTION INTRAMUSCULAR at 06:00

## 2022-03-25 ASSESSMENT — PAIN DESCRIPTION - PAIN TYPE: TYPE: ACUTE PAIN

## 2022-03-25 ASSESSMENT — FIBROSIS 4 INDEX: FIB4 SCORE: 0.49

## 2022-03-25 NOTE — ED PROVIDER NOTES
"ED Provider Note    CHIEF COMPLAINT  Chief Complaint   Patient presents with   • Flank Pain     Bilateral flank pain x 3 weeks. Pt reports BLE weeping edema.  Pt reports \"a lot of relief to urinate\". +constipation       HPI  Colt Correa is a 41 y.o. male who presents to the emergency department through triage with significant other for multiple vague complaints.  Malaise and fatigue.  Change in appetite.  Patient is most concerned for \"kidney pain.\"  He points to bilateral flanks.  Also concern for my liver hurts,\" and points to the right upper quadrant.  Episodes of chest pain, shortness of breath yesterday as well as \"I felt like I could not move at all,\" but all the symptoms have resolved.  No headache, slurred speech, focal weakness.  No syncope.  No fever or chills.  Decreased appetite but tolerating food and fluids.    Patient describes 14 months sobriety from alcohol and drugs such as methamphetamines.  During that time he has had some weight gain.  He did start using again earlier this month, he has used methamphetamine on multiple occasions, last use less than 48 hours ago.       REVIEW OF SYSTEMS  See HPI for further details. All other systems are negative.     PAST MEDICAL HISTORY   has a past medical history of ASTHMA, GERD (gastroesophageal reflux disease), Methamphetamine abuse (HCC) (3/16/2013), and Tobacco use.    SOCIAL HISTORY  Social History     Tobacco Use   • Smoking status: Current Some Day Smoker     Packs/day: 0.50     Types: Cigarettes   • Smokeless tobacco: Never Used   Vaping Use   • Vaping Use: Never used   Substance and Sexual Activity   • Alcohol use: Yes   • Drug use: Yes     Types: Inhaled     Comment: relapsed on Meth- 3/23/22   • Sexual activity: Yes     Partners: Female       SURGICAL HISTORY   has a past surgical history that includes tonsillectomy and adenoidectomy and other (comments).    CURRENT MEDICATIONS  Home Medications     Reviewed by Mi Castorena R.N. " "(Registered Nurse) on 03/25/22 at 0427  Med List Status: <None>   Medication Last Dose Status        Patient Feliz Taking any Medications                       ALLERGIES  Allergies   Allergen Reactions   • Percocet [Oxycodone-Acetaminophen] Hives   • Benadryl [Altaryl]        PHYSICAL EXAM  VITAL SIGNS: /68   Pulse 66   Temp 36.3 °C (97.4 °F) (Temporal)   Resp 18   Ht 1.778 m (5' 10\")   Wt 106 kg (233 lb 11 oz)   SpO2 93%   BMI 33.53 kg/m²   Pulse ox interpretation: I interpret this pulse ox as normal.  Constitutional: Alert in no apparent distress.  HENT: Normocephalic, atraumatic. Bilateral external ears normal, Nose normal. Moist mucous membranes.    Eyes: Pupils are equal and reactive, Conjunctiva normal.  EOMI.  No nystagmus.  Neck: Normal range of motion, Supple.  Is a really of no meningeal irritation.  Lymphatic: No lymphadenopathy noted.   Cardiovascular: Regular rate and rhythm, no murmurs. Distal pulses intact.  No peripheral edema.  Thorax & Lungs: Normal breath sounds.  No wheezing/rales/ronchi. No increased work of breathing, clipped speech or retractions.   Abdomen: Soft, non-distended, non-tender to palpation. No palpable or pulsatile masses. No peritoneal signs.  Mild CVA tenderness percussion bilaterally without cutaneous changes.  Skin: Warm, Dry, No erythema, No rash.   Musculoskeletal: Good range of motion in all major joints. No major deformities noted.   Neurologic: Alert and orient x4.  Speech is clear and cohesive.  Moves 4 extremities spontaneously.  Cranial nerves II through XII intact bilaterally.  5 out of 5 strength in 4 extremities with proximal and distal muscle groups.  No pronator drift.  Finger-nose intact bilaterally.  Straight leg raise intact bilaterally.  Psychiatric: Odd affect.  Anxious.  Aggressive but redirectable.  Mostly cooperative.      DIAGNOSTIC STUDIES / PROCEDURES    LABS  Results for orders placed or performed during the hospital encounter of 03/25/22 "   CBC WITH DIFFERENTIAL   Result Value Ref Range    WBC 11.9 (H) 4.8 - 10.8 K/uL    RBC 4.45 (L) 4.70 - 6.10 M/uL    Hemoglobin 13.9 (L) 14.0 - 18.0 g/dL    Hematocrit 41.7 (L) 42.0 - 52.0 %    MCV 93.7 81.4 - 97.8 fL    MCH 31.2 27.0 - 33.0 pg    MCHC 33.3 (L) 33.7 - 35.3 g/dL    RDW 42.3 35.9 - 50.0 fL    Platelet Count 316 164 - 446 K/uL    MPV 9.8 9.0 - 12.9 fL    Neutrophils-Polys 63.70 44.00 - 72.00 %    Lymphocytes 26.30 22.00 - 41.00 %    Monocytes 6.30 0.00 - 13.40 %    Eosinophils 2.60 0.00 - 6.90 %    Basophils 0.70 0.00 - 1.80 %    Immature Granulocytes 0.40 0.00 - 0.90 %    Nucleated RBC 0.00 /100 WBC    Neutrophils (Absolute) 7.57 (H) 1.82 - 7.42 K/uL    Lymphs (Absolute) 3.12 1.00 - 4.80 K/uL    Monos (Absolute) 0.75 0.00 - 0.85 K/uL    Eos (Absolute) 0.31 0.00 - 0.51 K/uL    Baso (Absolute) 0.08 0.00 - 0.12 K/uL    Immature Granulocytes (abs) 0.05 0.00 - 0.11 K/uL    NRBC (Absolute) 0.00 K/uL   COMP METABOLIC PANEL   Result Value Ref Range    Sodium 140 135 - 145 mmol/L    Potassium 4.5 3.6 - 5.5 mmol/L    Chloride 106 96 - 112 mmol/L    Co2 25 20 - 33 mmol/L    Anion Gap 9.0 7.0 - 16.0    Glucose 101 (H) 65 - 99 mg/dL    Bun 14 8 - 22 mg/dL    Creatinine 1.03 0.50 - 1.40 mg/dL    Calcium 9.3 8.5 - 10.5 mg/dL    AST(SGOT) 14 12 - 45 U/L    ALT(SGPT) 14 2 - 50 U/L    Alkaline Phosphatase 62 30 - 99 U/L    Total Bilirubin 0.7 0.1 - 1.5 mg/dL    Albumin 4.4 3.2 - 4.9 g/dL    Total Protein 6.6 6.0 - 8.2 g/dL    Globulin 2.2 1.9 - 3.5 g/dL    A-G Ratio 2.0 g/dL   LIPASE   Result Value Ref Range    Lipase 28 11 - 82 U/L   URINALYSIS    Specimen: Urine, Clean Catch   Result Value Ref Range    Color Yellow     Character Clear     Specific Gravity 1.019 <1.035    Ph 5.5 5.0 - 8.0    Glucose Negative Negative mg/dL    Ketones 15 (A) Negative mg/dL    Protein Negative Negative mg/dL    Bilirubin Negative Negative    Urobilinogen, Urine 0.2 Negative    Nitrite Negative Negative    Leukocyte Esterase Negative  Negative    Occult Blood Negative Negative    Micro Urine Req see below    ESTIMATED GFR   Result Value Ref Range    GFR (CKD-EPI) 93 >60 mL/min/1.73 m 2   CREATINE KINASE   Result Value Ref Range    CPK Total 114 0 - 154 U/L   proBrain Natriuretic Peptide, NT   Result Value Ref Range    NT-proBNP <5 0 - 125 pg/mL   TROPONIN   Result Value Ref Range    Troponin T 7 6 - 19 ng/L   EKG (NOW)   Result Value Ref Range    Report       [facility] Emergency Dept.    Test Date:  2022  Pt Name:    DEBRA VORA               Department: ER  MRN:        [id number]                      Room:       [facility]  Gender:     Male                         Technician:   :        1980                   Requested By:GISELLE ADHIKARI  Order #:    [id number]                    Reading MD: GISELLE ADHIKARI DO    Measurements  Intervals                                Axis  Rate:       58                           P:          64  IA:         140                          QRS:        42  QRSD:       90                           T:          22  QT:         408  QTc:        401    Interpretive Statements  SINUS BRADYCARDIA  RSR' IN V1 OR V2, RIGHT VCD OR RVH  BASELINE WANDER IN LEAD(S) I,II,aVR  Compared to ECG 2022 18:38:41  Right ventricular hypertrophy now present  RSR' in V1 or V2 now present  Sinus rhythm no longer present  Electronically Signed On 3- 8:36:26 PDT by GISELLE ADHIKARI DO       RADIOLOGY  DX-CHEST-PORTABLE (1 VIEW)   Final Result         1. No acute cardiopulmonary abnormalities are identified.      CT-RENAL COLIC EVALUATION(A/P W/O)   Final Result         1. No urinary tract calculus identified. No renal collecting system in dilatation.      2. No evidence of inflammatory change in the abdomen or pelvis.  The study is however limited due to nonuse of intravenous contrast          COURSE & MEDICAL DECISION MAKING  Nursing notes and vital signs were reviewed. (See chart for details)   The patients  records were reviewed, history was obtained from the patient;      medical record review: Patient has seen 3/5 and 3/8 this month for varying vague complaints.  Admitted to meth use.  Work-ups including complete labs, EKG, chest x-ray has been unremarkable.    ED evaluation for bilateral flank pain, malaise and fatigue is unrevealing.  Suspect the patient's vague ongoing symptoms are related to methamphetamine use and/or withdrawal syndrome as he has had some relapse and abstinence this month.  His physical exam is unremarkable.  He is neurologically intact and nonfocal.  Labs continue to be unremarkable.  Urinalysis unremarkable.  No clinical radiographic evidence for pneumonia, pneumothorax or thoracic arctic dissection.  Ongoing bilateral flank pain without hematuria, infection or stone.  No hydronephrosis.  No other intra-abdominal pathology.  Hemodynamically stable without fever, tachycardia or hypotension.  Mostly asymptomatic in the emergency department.  Resting comfortably without difficulty.  Ambulates independently.  Tolerating oral fluids.    Patient is stable for discharge at this time, anticipatory guidance provided, abstinence from drugs and alcohol encouraged, close follow-up is encouraged, and strict ED return instructions have been detailed. Patient is agreeable to the disposition and plan.    Patient's blood pressure was elevated in the emergency department, and has been referred to primary care for close monitoring.    FINAL IMPRESSION  (R53.81,  R53.83) Malaise and fatigue  (R10.9) Bilateral flank pain  (F19.10) Polysubstance abuse (HCC)      Electronically signed by: Sonya Smith D.O., 3/25/2022 8:32 AM      This dictation was created using voice recognition software. The accuracy of the dictation is limited to the abilities of the software. I expect there may be some errors of grammar and possibly content. The nursing notes were reviewed and certain aspects of this  information were incorporated into this note.

## 2022-03-25 NOTE — ED NOTES
"Pt awake alert and oriented. Respirations even and unlabored. Vitals stable at this time. Pt reports flank pain to both sides. Pt denies inability to void or burning with urination. Pt also reports yesterday he felt like his heart was \"beating out of his chest\" and for a moment it felt like his legs went numb. Pt full function at this time with the only complaint of flank pain.   "

## 2022-03-25 NOTE — ED TRIAGE NOTES
"Chief Complaint   Patient presents with   • Flank Pain     Bilateral flank pain x 3 weeks. Pt reports BLE weeping edema.  Pt reports \"a lot of relief to urinate\". +constipation       Pt to triage with steady gait for above complaint.     Pt presents in triage stating \"I have kidney pain\". Pt reports previous blood work shows \"kidneys are bad\". Minimal BLE edema in triage, pt states he has been retaining water the last few weeks    Pt back to lobby, educated on triage process and encourage to alert staff of any changes.     /95   Pulse 86   Temp 36.9 °C (98.4 °F) (Temporal)   Resp 16   Ht 1.778 m (5' 10\")   Wt 106 kg (233 lb 11 oz)   SpO2 94%   BMI 33.53 kg/m²     "

## 2022-03-25 NOTE — DISCHARGE INSTRUCTIONS
Follow-up with primary care next week for reevaluation, to reestablish care, for medication management and further work-up if symptoms persist.    Encourage oral fluid hydration and well-balanced diet.  Exercise and plenty of rest recommended as well.    Do not drink alcohol to excess.  Do not use illicit drugs, as this may be contributing to your symptoms.    Return to the emergency department for headache, altered mental status, chest pain, shortness of breath, palpitations, abdominal pain, vomiting, fever or other new concerns.

## 2022-11-19 ENCOUNTER — HOSPITAL ENCOUNTER (EMERGENCY)
Facility: MEDICAL CENTER | Age: 42
End: 2022-11-19
Attending: EMERGENCY MEDICINE

## 2022-11-19 VITALS
SYSTOLIC BLOOD PRESSURE: 122 MMHG | BODY MASS INDEX: 26.92 KG/M2 | HEIGHT: 70 IN | RESPIRATION RATE: 16 BRPM | DIASTOLIC BLOOD PRESSURE: 79 MMHG | TEMPERATURE: 98.8 F | HEART RATE: 86 BPM | WEIGHT: 188.05 LBS | OXYGEN SATURATION: 97 %

## 2022-11-19 DIAGNOSIS — J02.9 PHARYNGITIS, UNSPECIFIED ETIOLOGY: ICD-10-CM

## 2022-11-19 PROCEDURE — 99283 EMERGENCY DEPT VISIT LOW MDM: CPT

## 2022-11-19 ASSESSMENT — FIBROSIS 4 INDEX: FIB4 SCORE: 0.51

## 2022-11-19 NOTE — ED PROVIDER NOTES
ED Provider Note    ER Provider Note         CHIEF COMPLAINT  Chief Complaint   Patient presents with    Sore Throat       HPI  Colt Correa is a 42 y.o. male who presents to the Emergency Department with sore throat.  The patient says it has been burning.  The patient denies any fevers or chills.  He says at times it is difficult to breathe.  The patient is cursing at me and will not give any further history.  Made multiple attempts to get further information and he says he does not want to talk.    REVIEW OF SYSTEMS  See HPI for further details. All other systems are negative.     PAST MEDICAL HISTORY   has a past medical history of ASTHMA, GERD (gastroesophageal reflux disease), Methamphetamine abuse (HCC) (3/16/2013), and Tobacco use.    SURGICAL HISTORY   has a past surgical history that includes tonsillectomy and adenoidectomy and other (comments).    SOCIAL HISTORY  Social History     Tobacco Use    Smoking status: Some Days     Packs/day: 0.50     Types: Cigarettes    Smokeless tobacco: Never   Vaping Use    Vaping Use: Never used   Substance Use Topics    Alcohol use: Yes    Drug use: Yes     Types: Inhaled     Comment: relapsed on Meth- 3/23/22      Social History     Substance and Sexual Activity   Drug Use Yes    Types: Inhaled    Comment: relapsed on Meth- 3/23/22       FAMILY HISTORY  Family History   Problem Relation Age of Onset    Heart Disease Other         early heart disease in his 30s    Heart Disease Father     Heart Disease Paternal Uncle          of early heart disease in his 30s       CURRENT MEDICATIONS  Home Medications       Reviewed by Chrissy Zapata R.N. (Registered Nurse) on 22 at 0231  Med List Status: Not Addressed     Medication Last Dose Status        Patient Feliz Taking any Medications                           ALLERGIES  Allergies   Allergen Reactions    Percocet [Oxycodone-Acetaminophen] Hives    Benadryl [Altaryl]        PHYSICAL EXAM  VITAL SIGNS:  "/79   Pulse 86   Temp 37.1 °C (98.8 °F) (Temporal)   Resp 16   Ht 1.778 m (5' 10\")   Wt 85.3 kg (188 lb 0.8 oz)   SpO2 97%   BMI 26.98 kg/m²      Constitutional: Alert and with pressured speech and at times combative and using curse words.  HENT: No signs of trauma, Bilateral external ears normal, Nose normal.  Mild redness in the posterior oropharynx and no stridor  Eyes: Pupils are equal, Conjunctiva normal, Non-icteric.   Neck: Normal range of motion, No tenderness, Supple,  Lymphatic: No lymphadenopathy noted.   Cardiovascular: Regular rate and rhythm, nondisplaced PMI  Thorax & Lungs: No respiratory distress,  No chest tenderness.   Abdomen: Bowel sounds normal, Soft, No tenderness, No masses, No pulsatile masses. No peritoneal signs.  Skin: Warm, Dry, No erythema, No rash.   Back: No bony tenderness, No CVA tenderness.   Extremities: Intact distal pulses, No edema, No tenderness, No cyanosis.  Musculoskeletal: Good range of motion in all major joints. No tenderness to palpation or major deformities noted.   Neurologic: Alert , Normal motor function, Normal sensory function, No focal deficits noted.   Psychiatric: Pressured speech and at times very aggressive.    DIAGNOSTIC STUDIES / PROCEDURES        LABS  Plan will be to order strep swab.      COURSE & MEDICAL DECISION MAKING  Pertinent Labs & Imaging studies reviewed. (See chart for details)    This is a 42 y.o. male that presents with sore throat.  The patient became very aggressive and was cursing at me.  I tried to calm him down and stated be happy to continue evaluation and would likely need to do swabs of his throat.  He stated not want to be here any longer.  He did appear to be sober to me.  He at times he is more curse words and felt to be somewhat threatening physically.  He decided to walk out.  We did not try to detain him given his mental status was normal..     2:58 AM - Patient seen and examined at bedside.           FINAL " IMPRESSION  1. Pharyngitis, unspecified etiology              Electronically signed by: Marvin Pyle M.D., 11/19/2022

## 2022-11-19 NOTE — ED TRIAGE NOTES
"Colt Correa  42 y.o.  Chief Complaint   Patient presents with    Sore Throat     Ambulatory to lobby for above, pt writhing in pain in triage room. Pt reports severe 10/10 pain to throat starting this AM, states \"It perforated.\" Pt able to speak, refusing to answer most triage questions, VSS, placed back in lobby.  "

## 2022-11-19 NOTE — ED NOTES
The patient was rude to staff, and did not allow the physician to examine him.  Spoke profanity and walked out of the ER.

## 2023-05-10 ENCOUNTER — HOSPITAL ENCOUNTER (OUTPATIENT)
Facility: MEDICAL CENTER | Age: 43
End: 2023-05-11
Attending: EMERGENCY MEDICINE | Admitting: HOSPITALIST
Payer: MEDICAID

## 2023-05-10 ENCOUNTER — APPOINTMENT (OUTPATIENT)
Dept: RADIOLOGY | Facility: MEDICAL CENTER | Age: 43
End: 2023-05-10
Attending: EMERGENCY MEDICINE
Payer: MEDICAID

## 2023-05-10 DIAGNOSIS — R06.00 DYSPNEA, UNSPECIFIED TYPE: ICD-10-CM

## 2023-05-10 DIAGNOSIS — R07.9 CHEST PAIN, UNSPECIFIED TYPE: ICD-10-CM

## 2023-05-10 DIAGNOSIS — I10 HYPERTENSION, UNSPECIFIED TYPE: ICD-10-CM

## 2023-05-10 PROBLEM — R94.31 ABNORMAL EKG: Status: ACTIVE | Noted: 2023-05-10

## 2023-05-10 PROBLEM — R79.89 ELEVATED BRAIN NATRIURETIC PEPTIDE (BNP) LEVEL: Status: ACTIVE | Noted: 2023-05-10

## 2023-05-10 PROBLEM — R03.0 ELEVATED BLOOD PRESSURE READING: Status: ACTIVE | Noted: 2023-05-10

## 2023-05-10 LAB
ALBUMIN SERPL BCP-MCNC: 4.2 G/DL (ref 3.2–4.9)
ALBUMIN/GLOB SERPL: 1.8 G/DL
ALP SERPL-CCNC: 70 U/L (ref 30–99)
ALT SERPL-CCNC: 43 U/L (ref 2–50)
AMPHET UR QL SCN: NEGATIVE
ANION GAP SERPL CALC-SCNC: 8 MMOL/L (ref 7–16)
AST SERPL-CCNC: 29 U/L (ref 12–45)
BARBITURATES UR QL SCN: NEGATIVE
BASOPHILS # BLD AUTO: 0.7 % (ref 0–1.8)
BASOPHILS # BLD: 0.07 K/UL (ref 0–0.12)
BENZODIAZ UR QL SCN: NEGATIVE
BILIRUB SERPL-MCNC: 1 MG/DL (ref 0.1–1.5)
BUN SERPL-MCNC: 10 MG/DL (ref 8–22)
BZE UR QL SCN: NEGATIVE
CALCIUM ALBUM COR SERPL-MCNC: 9 MG/DL (ref 8.5–10.5)
CALCIUM SERPL-MCNC: 9.2 MG/DL (ref 8.4–10.2)
CANNABINOIDS UR QL SCN: NEGATIVE
CHLORIDE SERPL-SCNC: 105 MMOL/L (ref 96–112)
CO2 SERPL-SCNC: 26 MMOL/L (ref 20–33)
CREAT SERPL-MCNC: 0.76 MG/DL (ref 0.5–1.4)
EKG IMPRESSION: NORMAL
EOSINOPHIL # BLD AUTO: 0.3 K/UL (ref 0–0.51)
EOSINOPHIL NFR BLD: 3.2 % (ref 0–6.9)
ERYTHROCYTE [DISTWIDTH] IN BLOOD BY AUTOMATED COUNT: 46 FL (ref 35.9–50)
GFR SERPLBLD CREATININE-BSD FMLA CKD-EPI: 114 ML/MIN/1.73 M 2
GLOBULIN SER CALC-MCNC: 2.4 G/DL (ref 1.9–3.5)
GLUCOSE SERPL-MCNC: 94 MG/DL (ref 65–99)
HCT VFR BLD AUTO: 40.7 % (ref 42–52)
HGB BLD-MCNC: 13.8 G/DL (ref 14–18)
IMM GRANULOCYTES # BLD AUTO: 0.04 K/UL (ref 0–0.11)
IMM GRANULOCYTES NFR BLD AUTO: 0.4 % (ref 0–0.9)
LYMPHOCYTES # BLD AUTO: 2 K/UL (ref 1–4.8)
LYMPHOCYTES NFR BLD: 21.1 % (ref 22–41)
MCH RBC QN AUTO: 31.8 PG (ref 27–33)
MCHC RBC AUTO-ENTMCNC: 33.9 G/DL (ref 33.7–35.3)
MCV RBC AUTO: 93.8 FL (ref 81.4–97.8)
METHADONE UR QL SCN: NEGATIVE
MONOCYTES # BLD AUTO: 0.48 K/UL (ref 0–0.85)
MONOCYTES NFR BLD AUTO: 5.1 % (ref 0–13.4)
NEUTROPHILS # BLD AUTO: 6.59 K/UL (ref 1.82–7.42)
NEUTROPHILS NFR BLD: 69.5 % (ref 44–72)
NRBC # BLD AUTO: 0 K/UL
NRBC BLD-RTO: 0 /100 WBC
NT-PROBNP SERPL IA-MCNC: 292 PG/ML (ref 0–125)
OPIATES UR QL SCN: NEGATIVE
OXYCODONE UR QL SCN: NEGATIVE
PCP UR QL SCN: NEGATIVE
PLATELET # BLD AUTO: 296 K/UL (ref 164–446)
PMV BLD AUTO: 9.6 FL (ref 9–12.9)
POTASSIUM SERPL-SCNC: 4.1 MMOL/L (ref 3.6–5.5)
PROPOXYPH UR QL SCN: NEGATIVE
PROT SERPL-MCNC: 6.6 G/DL (ref 6–8.2)
RBC # BLD AUTO: 4.34 M/UL (ref 4.7–6.1)
SODIUM SERPL-SCNC: 139 MMOL/L (ref 135–145)
TROPONIN T SERPL-MCNC: 17 NG/L (ref 6–19)
TROPONIN T SERPL-MCNC: 18 NG/L (ref 6–19)
TROPONIN T SERPL-MCNC: 23 NG/L (ref 6–19)
WBC # BLD AUTO: 9.5 K/UL (ref 4.8–10.8)

## 2023-05-10 PROCEDURE — G0378 HOSPITAL OBSERVATION PER HR: HCPCS

## 2023-05-10 PROCEDURE — 83880 ASSAY OF NATRIURETIC PEPTIDE: CPT

## 2023-05-10 PROCEDURE — 99285 EMERGENCY DEPT VISIT HI MDM: CPT

## 2023-05-10 PROCEDURE — 700102 HCHG RX REV CODE 250 W/ 637 OVERRIDE(OP): Performed by: HOSPITALIST

## 2023-05-10 PROCEDURE — A9270 NON-COVERED ITEM OR SERVICE: HCPCS | Performed by: EMERGENCY MEDICINE

## 2023-05-10 PROCEDURE — 80053 COMPREHEN METABOLIC PANEL: CPT

## 2023-05-10 PROCEDURE — 85025 COMPLETE CBC W/AUTO DIFF WBC: CPT

## 2023-05-10 PROCEDURE — 94760 N-INVAS EAR/PLS OXIMETRY 1: CPT

## 2023-05-10 PROCEDURE — 84484 ASSAY OF TROPONIN QUANT: CPT

## 2023-05-10 PROCEDURE — 80307 DRUG TEST PRSMV CHEM ANLYZR: CPT

## 2023-05-10 PROCEDURE — 700102 HCHG RX REV CODE 250 W/ 637 OVERRIDE(OP): Performed by: EMERGENCY MEDICINE

## 2023-05-10 PROCEDURE — 36415 COLL VENOUS BLD VENIPUNCTURE: CPT

## 2023-05-10 PROCEDURE — 71045 X-RAY EXAM CHEST 1 VIEW: CPT

## 2023-05-10 PROCEDURE — 99223 1ST HOSP IP/OBS HIGH 75: CPT | Performed by: HOSPITALIST

## 2023-05-10 PROCEDURE — A9270 NON-COVERED ITEM OR SERVICE: HCPCS | Performed by: HOSPITALIST

## 2023-05-10 PROCEDURE — 93005 ELECTROCARDIOGRAM TRACING: CPT

## 2023-05-10 RX ORDER — AMOXICILLIN 250 MG
2 CAPSULE ORAL 2 TIMES DAILY
Status: DISCONTINUED | OUTPATIENT
Start: 2023-05-10 | End: 2023-05-11 | Stop reason: HOSPADM

## 2023-05-10 RX ORDER — ASPIRIN 81 MG/1
324 TABLET, CHEWABLE ORAL ONCE
Status: COMPLETED | OUTPATIENT
Start: 2023-05-10 | End: 2023-05-10

## 2023-05-10 RX ORDER — AMOXICILLIN 500 MG/1
500 CAPSULE ORAL 3 TIMES DAILY
Status: ON HOLD | COMMUNITY
End: 2023-05-12

## 2023-05-10 RX ORDER — ACETAMINOPHEN 500 MG
1000 TABLET ORAL EVERY 6 HOURS PRN
COMMUNITY

## 2023-05-10 RX ORDER — ACETAMINOPHEN 325 MG/1
650 TABLET ORAL EVERY 6 HOURS PRN
Status: DISCONTINUED | OUTPATIENT
Start: 2023-05-10 | End: 2023-05-11 | Stop reason: HOSPADM

## 2023-05-10 RX ORDER — BISACODYL 10 MG
10 SUPPOSITORY, RECTAL RECTAL
Status: DISCONTINUED | OUTPATIENT
Start: 2023-05-10 | End: 2023-05-11 | Stop reason: HOSPADM

## 2023-05-10 RX ORDER — IBUPROFEN 200 MG
600 TABLET ORAL EVERY 6 HOURS PRN
COMMUNITY
End: 2023-08-29

## 2023-05-10 RX ORDER — NITROGLYCERIN 0.4 MG/1
0.4 TABLET SUBLINGUAL
Status: DISCONTINUED | OUTPATIENT
Start: 2023-05-10 | End: 2023-05-11 | Stop reason: HOSPADM

## 2023-05-10 RX ORDER — AMOXICILLIN AND CLAVULANATE POTASSIUM 875; 125 MG/1; MG/1
TABLET, FILM COATED ORAL
Status: SHIPPED | COMMUNITY
End: 2023-05-10

## 2023-05-10 RX ORDER — POLYETHYLENE GLYCOL 3350 17 G/17G
1 POWDER, FOR SOLUTION ORAL
Status: DISCONTINUED | OUTPATIENT
Start: 2023-05-10 | End: 2023-05-11 | Stop reason: HOSPADM

## 2023-05-10 RX ORDER — HYDRALAZINE HYDROCHLORIDE 20 MG/ML
10 INJECTION INTRAMUSCULAR; INTRAVENOUS EVERY 4 HOURS PRN
Status: DISCONTINUED | OUTPATIENT
Start: 2023-05-10 | End: 2023-05-11 | Stop reason: HOSPADM

## 2023-05-10 RX ADMIN — RIVAROXABAN 10 MG: 10 TABLET, FILM COATED ORAL at 18:02

## 2023-05-10 RX ADMIN — ASPIRIN 81 MG 324 MG: 81 TABLET ORAL at 09:26

## 2023-05-10 RX ADMIN — ACETAMINOPHEN 650 MG: 325 TABLET ORAL at 22:36

## 2023-05-10 ASSESSMENT — COGNITIVE AND FUNCTIONAL STATUS - GENERAL
DAILY ACTIVITIY SCORE: 24
SUGGESTED CMS G CODE MODIFIER MOBILITY: CH
MOBILITY SCORE: 24
SUGGESTED CMS G CODE MODIFIER DAILY ACTIVITY: CH

## 2023-05-10 ASSESSMENT — LIFESTYLE VARIABLES
ALCOHOL_USE: YES
TOTAL SCORE: 0
HAVE PEOPLE ANNOYED YOU BY CRITICIZING YOUR DRINKING: NO
ON A TYPICAL DAY WHEN YOU DRINK ALCOHOL HOW MANY DRINKS DO YOU HAVE: 2
CONSUMPTION TOTAL: NEGATIVE
TOTAL SCORE: 0
EVER HAD A DRINK FIRST THING IN THE MORNING TO STEADY YOUR NERVES TO GET RID OF A HANGOVER: NO
TOTAL SCORE: 0
HAVE YOU EVER FELT YOU SHOULD CUT DOWN ON YOUR DRINKING: NO
EVER FELT BAD OR GUILTY ABOUT YOUR DRINKING: NO
AVERAGE NUMBER OF DAYS PER WEEK YOU HAVE A DRINK CONTAINING ALCOHOL: 5
HOW MANY TIMES IN THE PAST YEAR HAVE YOU HAD 5 OR MORE DRINKS IN A DAY: 0

## 2023-05-10 ASSESSMENT — HEART SCORE
HEART SCORE: 3
RISK FACTORS: >2 RISK FACTORS OR HX OF ATHEROSCLEROTIC DISEASE
AGE: <45
TROPONIN: LESS THAN OR EQUAL TO NORMAL LIMIT
ECG: NORMAL
HISTORY: MODERATELY SUSPICIOUS

## 2023-05-10 ASSESSMENT — FIBROSIS 4 INDEX
FIB4 SCORE: 0.64
FIB4 SCORE: 0.6

## 2023-05-10 ASSESSMENT — ENCOUNTER SYMPTOMS
MYALGIAS: 0
STRIDOR: 0
EYE REDNESS: 0
FOCAL WEAKNESS: 0
NERVOUS/ANXIOUS: 0
SHORTNESS OF BREATH: 1
COUGH: 0
FLANK PAIN: 0
CHILLS: 0
BRUISES/BLEEDS EASILY: 0
EYE DISCHARGE: 0
ABDOMINAL PAIN: 0
FEVER: 0
VOMITING: 0

## 2023-05-10 ASSESSMENT — PAIN DESCRIPTION - PAIN TYPE
TYPE: ACUTE PAIN

## 2023-05-10 ASSESSMENT — PATIENT HEALTH QUESTIONNAIRE - PHQ9
2. FEELING DOWN, DEPRESSED, IRRITABLE, OR HOPELESS: NOT AT ALL
SUM OF ALL RESPONSES TO PHQ9 QUESTIONS 1 AND 2: 0
1. LITTLE INTEREST OR PLEASURE IN DOING THINGS: NOT AT ALL

## 2023-05-10 NOTE — ASSESSMENT & PLAN NOTE
EKG showssinus bradycardia with a rate of 56, there is no ST elevation, there is abnormal repolarization pattern and V2-V4, there is evidence of LVH.  QTc is 405.  Continuous cardiac monitoring.  We will check echocardiography.  We will check thyroid function test

## 2023-05-10 NOTE — ASSESSMENT & PLAN NOTE
EKG shows sinus bradycardia with a rate of 56, there is no ST elevation, there is abnormal repolarization pattern and V2-V4, there is evidence of LVH.  QTc is 405.  Initial troponin is within normal limits, I will trend  Stat EKG and troponin for recurrence of chest pain.   Continuous cardiac monitoring.  Plan for stress test in the morning [ordered] as long as drug screen is negative, and there are no significant EKG changes or significant troponin elevation

## 2023-05-10 NOTE — ECG
Erp to bedside, saline lock with blood draw. Triage ekg = no stemi. Aware of pending cxr and meds. In no apparent distress, sr-sb on moniter, call light in reach

## 2023-05-10 NOTE — ED NOTES
"Pt to er with c/o mid chest pain to left chest \"for weeks\", is homeless, reports had a heart attack about a week ago. Denies use of ntg for pain  "

## 2023-05-10 NOTE — ED PROVIDER NOTES
ED Provider Note    CHIEF COMPLAINT  Chief Complaint   Patient presents with    Chest Pain     Mid sternal and radiates to left chest     EXTERNAL RECORDS REVIEWED  Patient was seen at [place] April 28, 2 weeks ago with a normal work-up for chest pain.  He was seen again today there and left AGAINST MEDICAL ADVICE prior to any work-up.  It appears he had a stress test in our system last in 2013.  He was seen by Dr. Myers with cardiology in May 2021 for chest pain with a plan to obtain stress test and echocardiogram that does not appear it was ever performed.  He does have a history of methamphetamine abuse as well as family history of early cardiac disease and tobacco use.    HPI/ROS  LIMITATION TO HISTORY   Select: : None  OUTSIDE HISTORIAN(S):  None    Colt Correa is a 42 y.o. male who presents to the Emergency Department with chest pain.  Patient reports symptoms have been going on intermittently for the last month.  He reports a severe pain in the substernal area that radiates to the left chest.  This is worse with exertion.  He is homeless and walks multiple miles a day and has been finding his symptoms have been worsening over the last few weeks.  He has been seen at [place] twice for this however states that symptoms or not improving.  He does report some mild shortness of breath as well as feeling as though he is gaining weight with some abdominal swelling.  He has not had any swelling in his lower extremities.  No recent fevers or coughing.  He has a history of methamphetamine abuse but states he has been clean for the last 3 months.    PAST MEDICAL HISTORY  Past Medical History:   Diagnosis Date    ASTHMA     GERD (gastroesophageal reflux disease)     Methamphetamine abuse ([facility]) 3/16/2013    Tobacco use         SURGICAL HISTORY  Past Surgical History:   Procedure Laterality Date    OTHER (COMMENTS)      sinus surgery    TONSILLECTOMY AND ADENOIDECTOMY          FAMILY HISTORY  Family  "History   Problem Relation Age of Onset    Heart Disease Other         early heart disease in his 30s    Heart Disease Father     Heart Disease Paternal Uncle          of early heart disease in his 30s       SOCIAL HISTORY   reports that he has been smoking cigarettes. He has been smoking an average of .5 packs per day. He has never used smokeless tobacco. He reports current alcohol use. He reports current drug use. Drug: Inhaled.    CURRENT MEDICATIONS  Current Discharge Medication List        CONTINUE these medications which have NOT CHANGED    Details   amoxicillin (AMOXIL) 500 MG Cap Take 500 mg by mouth 3 times a day. Dental pain      acetaminophen (TYLENOL) 500 MG Tab Take 1,000 mg by mouth every 6 hours as needed for Moderate Pain. Indications: Pain      ibuprofen (MOTRIN) 200 MG Tab Take 600 mg by mouth every 6 hours as needed for Mild Pain.             ALLERGIES  Percocet [oxycodone-acetaminophen] and Benadryl [altaryl]    PHYSICAL EXAM  BP (!) 153/94   Pulse (!) 58   Temp 36.6 °C (97.9 °F) (Temporal)   Resp (!) 22   Ht 1.778 m (5' 10\")   Wt 81.3 kg (179 lb 3.7 oz)   SpO2 97%    Constitutional: Nontoxic appearing. Alert in no apparent distress.  HENT: Normocephalic, Atraumatic. Bilateral external ears normal. Nose normal.  Moist mucous membranes.  Oropharynx clear.  Eyes: Pupils are equal and reactive. Conjunctiva normal.   Neck: Supple, full range of motion  Heart: Regular rate and rhythm.  No murmurs.    Lungs: No respiratory distress, normal work of breathing. Lungs clear to auscultation bilaterally.  Abdomen Soft, no distention.  No tenderness to palpation.  Musculoskeletal: Atraumatic. No obvious deformities noted.  No lower extremity edema.  Skin: Warm, Dry.  No erythema, No rash.   Neurologic: Alert and oriented x3. Moving all extremities spontaneously without focal deficits.  Psychiatric: Affect normal, Mood normal, Appears appropriate and not intoxicated.      DIAGNOSTIC STUDIES / " PROCEDURES    EKG  I have independently interpreted this EKG  Results for orders placed or performed during the hospital encounter of 05/10/23   EKG   Result Value Ref Range    Report       [facility] Emergency Dept.    Test Date:  2023-05-10  Pt Name:    DEBRA VORA               Department: [facility]  MRN:        [id number]                      Room:       3314  Gender:     Male                         Technician: [id number]  :        1980                   Requested By:ER TRIAGE PROTOCOL  Order #:    [id number]                    Reading MD: Mayela Arredondo MD    Measurements  Intervals                                Axis  Rate:       56                           P:          68  NM:         136                          QRS:        67  QRSD:       90                           T:          65  QT:         419  QTc:        405    Interpretive Statements  Sinus rhythm  RSR' in V1 or V2, probably normal variant  No ST or T wave change  Compared to ECG 2022 06:07:32  No significant change from prior  Electronically Signed On 5- 12:18:18 PDT by Mayela Arredondo MD         LABS  Labs Reviewed   CBC WITH DIFFERENTIAL - Abnormal; Notable for the following components:       Result Value    RBC 4.34 (*)     Hemoglobin 13.8 (*)     Hematocrit 40.7 (*)     Lymphocytes 21.10 (*)     All other components within normal limits    Narrative:     Biotin intake of greater than 5 mg per day may interfere with  troponin levels, causing false low values.   PROBRAIN NATRIURETIC PEPTIDE, NT - Abnormal; Notable for the following components:    NT-proBNP 292 (*)     All other components within normal limits    Narrative:     Biotin intake of greater than 5 mg per day may interfere with  troponin levels, causing false low values.   COMP METABOLIC PANEL    Narrative:     Biotin intake of greater than 5 mg per day may interfere with  troponin levels, causing false low values.   TROPONIN    Narrative:     Biotin  intake of greater than 5 mg per day may interfere with  troponin levels, causing false low values.   CORRECTED CALCIUM    Narrative:     Biotin intake of greater than 5 mg per day may interfere with  troponin levels, causing false low values.   ESTIMATED GFR    Narrative:     Biotin intake of greater than 5 mg per day may interfere with  troponin levels, causing false low values.   TROPONIN         RADIOLOGY  I have independently interpreted the diagnostic imaging associated with this visit and am waiting the final reading from the radiologist.   My preliminary interpretation is a follows: no infiltrate  Radiologist interpretation:   DX-CHEST-PORTABLE (1 VIEW)   Final Result         1. No acute cardiopulmonary abnormalities are identified.            COURSE & MEDICAL DECISION MAKING    ED Observation Status? Yes; I am placing the patient in to an observation status due to a diagnostic uncertainty as well as therapeutic intensity. Patient placed in observation status at 9:05 AM, 5/10/2023.     Observation plan is as follows: ekg, labs, chest xray    Upon Reevaluation, the patient's condition has: not improved; and will be escalated to hospitalization.    Patient discharged from ED Observation status at 11:20 AM (Time) 05/10/23   (Date).     INITIAL ASSESSMENT, COURSE AND PLAN  Care Narrative: Homeless patient with history of early cardiac disease in his family who presents with 1 month history of intermittent exertional chest pain.  He is hypertensive with otherwise reassuring vital signs on arrival.  His EKG does not show evidence of ischemia or arrhythmia.  Initial troponin is negative.  Clinically doubt aortic dissection or pulmonary embolism based on history and exam.  His chest x-ray does not show pneumonia or pulmonary edema.  BNP only slightly elevated without signs of congestive heart failure.  Patient has a HEART score of 3 however due to the fact he has had recurrent visits for this and is homeless and is  difficult with outpatient follow-up, will plan to admit for further cardiac work-up including stress test and echo.    11:21 AM - Upon reassessment, patient is resting comfortably with normal vital signs.  No new complaints at this time.  Discussed results with patient and/or family as well as plan of care for admission.  Patient is agreeable at this time.      ADDITIONAL PROBLEM LIST  Problem #1: Acute chest pain - HEART score 3 however due to recurrent visits and social situation, will admit for further workup    Problem #2:  Hypertension - continue to monitor      DISPOSITION AND DISCUSSIONS  I have discussed management of the patient with the following physicians and REID's:    I discussed the case with Dr. Cho, hospitalist on-call, who accepts admission of the patient.      Barriers to care at this time, including but not limited to: Patient is homeless, Patient lacks transportation , and Patient had difficult affording medications.     DISPOSITION:  Patient will be hospitalized by Dr. Cho in guarded condition.      FINAL DIAGNOSIS  1. Chest pain, unspecified type    2. Dyspnea, unspecified type    3. Hypertension, unspecified type

## 2023-05-10 NOTE — H&P
Hospital Medicine History & Physical Note    Date of Service  5/10/2023    Primary Care Physician  Una Monge P.A.-C.    Consultants  None    Code Status  Full Code    Chief Complaint  Chief Complaint   Patient presents with    Chest Pain     Mid sternal and radiates to left chest     History of Presenting Illness  Colt Correa is a 42 y.o. male with a past medical history of meth use in remission who presented 5/10/2023 with chest pain and shortness of breath.  Patient reports that he has been having progressively worsening multiple episodes of chest pain and shortness of breath for the last 1 month.  The pain is severe located in the retrosternal region.  The pain is worse after exertion and does improve with resting.  Patient denies having fevers chills and cough.  He denies using methamphetamine since 3 months.     I discussed the plan of care with emergency department physician, the patient.    Review of Systems  Review of Systems   Constitutional:  Negative for chills and fever.   Eyes:  Negative for discharge and redness.   Respiratory:  Positive for shortness of breath. Negative for cough and stridor.    Cardiovascular:  Positive for chest pain. Negative for leg swelling.   Gastrointestinal:  Negative for abdominal pain and vomiting.   Genitourinary:  Negative for flank pain.   Musculoskeletal:  Negative for myalgias.   Skin: Negative.    Neurological:  Negative for focal weakness.   Endo/Heme/Allergies:  Does not bruise/bleed easily.   Psychiatric/Behavioral:  The patient is not nervous/anxious.      Past Medical History   has a past medical history of ASTHMA, GERD (gastroesophageal reflux disease), Methamphetamine abuse (HCC) (3/16/2013), and Tobacco use.    Surgical History   has a past surgical history that includes tonsillectomy and adenoidectomy and other (comments).     Family History  family history includes Heart Disease in his father, paternal uncle, and another family member.       Social History   reports that he has been smoking cigarettes. He has been smoking an average of .5 packs per day. He has never used smokeless tobacco. He reports current alcohol use. He reports current drug use. Drug: Inhaled.    Allergies  Allergies   Allergen Reactions    Benadryl [Altaryl] Unspecified     twitching    Dramamine [Dimenhydrinate] Unspecified     twitching    Oxycodone Nausea     Medications  Prior to Admission Medications   Prescriptions Last Dose Informant Patient Reported? Taking?   acetaminophen (TYLENOL) 500 MG Tab <2 weeks at [facility] Patient Yes Yes   Sig: Take 1,000 mg by mouth every 6 hours as needed for Moderate Pain. Indications: Pain   amoxicillin (AMOXIL) 500 MG Cap 4/26/2023 at [facility] Patient Yes Yes   Sig: Take 500 mg by mouth 3 times a day. Dental pain   ibuprofen (MOTRIN) 200 MG Tab <2 weeks at [facility] Patient Yes Yes   Sig: Take 600 mg by mouth every 6 hours as needed for Mild Pain.      Facility-Administered Medications: None     Physical Exam  Temp:  [36.6 °C (97.8 °F)-36.7 °C (98 °F)] 36.6 °C (97.8 °F)  Pulse:  [58-64] 64  Resp:  [13-22] 18  BP: (125-155)/(79-99) 125/79  SpO2:  [97 %-99 %] 97 %  Blood Pressure: (!) 142/90   Temperature: 36.6 °C (97.9 °F)   Pulse: 61   Respiration: 13   Pulse Oximetry: 99 %     Physical Exam  Constitutional:       General: He is not in acute distress.     Appearance: He is not ill-appearing or diaphoretic.   HENT:      Head: Atraumatic.      Right Ear: External ear normal.      Left Ear: External ear normal.      Nose: No congestion or rhinorrhea.      Mouth/Throat:      Mouth: Mucous membranes are moist.   Eyes:      General: No scleral icterus.        Right eye: No discharge.         Left eye: No discharge.      Pupils: Pupils are equal, round, and reactive to light.   Cardiovascular:      Rate and Rhythm: Regular rhythm. Bradycardia present.   Pulmonary:      Effort: Pulmonary effort is normal.   Abdominal:      General: There is no distension.    Musculoskeletal:      Cervical back: Neck supple. No rigidity. No muscular tenderness.      Right lower leg: No edema.      Left lower leg: No edema.   Skin:     Coloration: Skin is not jaundiced or pale.   Neurological:      Mental Status: He is alert and oriented to person, place, and time.      Coordination: Coordination normal.   Psychiatric:         Mood and Affect: Mood normal.         Behavior: Behavior normal.       Laboratory:  Recent Labs     05/10/23  0911   WBC 9.5   RBC 4.34*   HEMOGLOBIN 13.8*   HEMATOCRIT 40.7*   MCV 93.8   MCH 31.8   MCHC 33.9   RDW 46.0   PLATELETCT 296   MPV 9.6     Recent Labs     05/10/23  0911   SODIUM 139   POTASSIUM 4.1   CHLORIDE 105   CO2 26   GLUCOSE 94   BUN 10   CREATININE 0.76   CALCIUM 9.2     Recent Labs     05/10/23  0911   ALTSGPT 43   ASTSGOT 29   ALKPHOSPHAT 70   TBILIRUBIN 1.0   GLUCOSE 94         Recent Labs     05/10/23  0911   NTPROBNP 292*         Recent Labs     05/10/23  0911 05/10/23  1526   TROPONINT 17 18     Imaging:  DX-CHEST-PORTABLE (1 VIEW)   Final Result         1. No acute cardiopulmonary abnormalities are identified.      EC-ECHOCARDIOGRAM COMPLETE W/O CONT    (Results Pending)     I personally reviewed the patient's EKG which shows sinus bradycardia with a rate of 56, there is no ST elevation, there is abnormal repolarization pattern and V2-V4, there is evidence of LVH.  QTc is 405.    Assessment/Plan:  Justification for Admission Status  I anticipate this patient is appropriate for observation status at this time because likely discharge after 1 midnight    Patient will need a  bed on telemetry/cardiology.  The patient has chest pain    * Pain in the chest- (present on admission)  Assessment & Plan  EKG shows sinus bradycardia with a rate of 56, there is no ST elevation, there is abnormal repolarization pattern and V2-V4, there is evidence of LVH.  QTc is 405.  Initial troponin is within normal limits, I will trend  Stat EKG and troponin for  recurrence of chest pain.   Continuous cardiac monitoring.  Plan for stress test in the morning [ordered] as long as drug screen is negative, and there are no significant EKG changes or significant troponin elevation     Abnormal EKG- (present on admission)  Assessment & Plan  EKG showssinus bradycardia with a rate of 56, there is no ST elevation, there is abnormal repolarization pattern and V2-V4, there is evidence of LVH.  QTc is 405.  Continuous cardiac monitoring.  We will check echocardiography.  We will check thyroid function test    Elevated brain natriuretic peptide (BNP) level- (present on admission)  Assessment & Plan  With exertional shortness of breath and chest pain.  EKG shows sinus bradycardia with a rate of 56, there is no ST elevation, there is abnormal repolarization pattern and V2-V4, there is evidence of LVH.  QTc is 405.  Continuous cardiac monitoring.  I will check echocardiography to rule out cardiomyopathy/heart failure     Elevated blood pressure reading- (present on admission)  Assessment & Plan  High blood pressure denies a diagnosis no known diagnosis of hypertension.  We will start as needed hydralazine for extreme hypertension   Vital signs per policy  Consider starting scheduled medications according to blood pressure trend.    Methamphetamine abuse in remission (HCC)- (present on admission)  Assessment & Plan  Reports last use was 3 months ago  We will check administering prior to stress test    VTE prophylaxis: SCDs/TEDs and Xarelto 10 mg daily as prophylaxis

## 2023-05-10 NOTE — ASSESSMENT & PLAN NOTE
With exertional shortness of breath and chest pain.  EKG shows sinus bradycardia with a rate of 56, there is no ST elevation, there is abnormal repolarization pattern and V2-V4, there is evidence of LVH.  QTc is 405.  Continuous cardiac monitoring.  I will check echocardiography to rule out cardiomyopathy/heart failure    Self

## 2023-05-10 NOTE — CARE PLAN
Pt arrived to unit, pt in no acute distress, call light is within reach.   Problem: Pain - Standard  Goal: Alleviation of pain or a reduction in pain to the patient’s comfort goal  Outcome: Progressing     Problem: Knowledge Deficit - Standard  Goal: Patient and family/care givers will demonstrate understanding of plan of care, disease process/condition, diagnostic tests and medications  Outcome: Progressing   The patient is Stable - Low risk of patient condition declining or worsening

## 2023-05-10 NOTE — ASSESSMENT & PLAN NOTE
High blood pressure denies a diagnosis no known diagnosis of hypertension.  We will start as needed hydralazine for extreme hypertension   Vital signs per policy  Consider starting scheduled medications according to blood pressure trend.

## 2023-05-11 ENCOUNTER — HOSPITAL ENCOUNTER (INPATIENT)
Facility: MEDICAL CENTER | Age: 43
LOS: 2 days | DRG: 247 | End: 2023-05-13
Attending: HOSPITALIST | Admitting: HOSPITALIST
Payer: MEDICAID

## 2023-05-11 ENCOUNTER — APPOINTMENT (OUTPATIENT)
Dept: CARDIOLOGY | Facility: MEDICAL CENTER | Age: 43
End: 2023-05-11
Attending: HOSPITALIST
Payer: MEDICAID

## 2023-05-11 ENCOUNTER — APPOINTMENT (OUTPATIENT)
Dept: RADIOLOGY | Facility: MEDICAL CENTER | Age: 43
End: 2023-05-11
Attending: HOSPITALIST
Payer: MEDICAID

## 2023-05-11 VITALS
HEIGHT: 70 IN | RESPIRATION RATE: 16 BRPM | BODY MASS INDEX: 25.66 KG/M2 | OXYGEN SATURATION: 100 % | WEIGHT: 179.23 LBS | HEART RATE: 70 BPM | TEMPERATURE: 97.8 F | DIASTOLIC BLOOD PRESSURE: 83 MMHG | SYSTOLIC BLOOD PRESSURE: 133 MMHG

## 2023-05-11 DIAGNOSIS — R07.9 CHEST PAIN, UNSPECIFIED TYPE: ICD-10-CM

## 2023-05-11 DIAGNOSIS — Z95.820 S/P ANGIOPLASTY WITH STENT: ICD-10-CM

## 2023-05-11 DIAGNOSIS — I20.0 UNSTABLE ANGINA (HCC): ICD-10-CM

## 2023-05-11 DIAGNOSIS — I25.110 CORONARY ARTERY DISEASE INVOLVING NATIVE CORONARY ARTERY OF NATIVE HEART WITH UNSTABLE ANGINA PECTORIS (HCC): ICD-10-CM

## 2023-05-11 LAB
ALBUMIN SERPL BCP-MCNC: 3.9 G/DL (ref 3.2–4.9)
ALBUMIN/GLOB SERPL: 1.8 G/DL
ALP SERPL-CCNC: 68 U/L (ref 30–99)
ALT SERPL-CCNC: 37 U/L (ref 2–50)
ANION GAP SERPL CALC-SCNC: 7 MMOL/L (ref 7–16)
AST SERPL-CCNC: 25 U/L (ref 12–45)
BILIRUB SERPL-MCNC: 0.6 MG/DL (ref 0.1–1.5)
BUN SERPL-MCNC: 17 MG/DL (ref 8–22)
CALCIUM ALBUM COR SERPL-MCNC: 9.1 MG/DL (ref 8.5–10.5)
CALCIUM SERPL-MCNC: 9 MG/DL (ref 8.4–10.2)
CHLORIDE SERPL-SCNC: 103 MMOL/L (ref 96–112)
CHOLEST SERPL-MCNC: 136 MG/DL (ref 100–199)
CO2 SERPL-SCNC: 28 MMOL/L (ref 20–33)
CREAT SERPL-MCNC: 1.06 MG/DL (ref 0.5–1.4)
EKG IMPRESSION: NORMAL
ERYTHROCYTE [DISTWIDTH] IN BLOOD BY AUTOMATED COUNT: 47.8 FL (ref 35.9–50)
GFR SERPLBLD CREATININE-BSD FMLA CKD-EPI: 89 ML/MIN/1.73 M 2
GLOBULIN SER CALC-MCNC: 2.2 G/DL (ref 1.9–3.5)
GLUCOSE SERPL-MCNC: 92 MG/DL (ref 65–99)
HCT VFR BLD AUTO: 40.7 % (ref 42–52)
HDLC SERPL-MCNC: 67 MG/DL
HGB BLD-MCNC: 13.5 G/DL (ref 14–18)
LDLC SERPL CALC-MCNC: 56 MG/DL
LV EJECT FRACT  99904: 60
LV EJECT FRACT MOD 2C 99903: 62.83
LV EJECT FRACT MOD 4C 99902: 56.22
LV EJECT FRACT MOD BP 99901: 58.32
MAGNESIUM SERPL-MCNC: 1.9 MG/DL (ref 1.5–2.5)
MCH RBC QN AUTO: 31.7 PG (ref 27–33)
MCHC RBC AUTO-ENTMCNC: 33.2 G/DL (ref 33.7–35.3)
MCV RBC AUTO: 95.5 FL (ref 81.4–97.8)
PLATELET # BLD AUTO: 300 K/UL (ref 164–446)
PMV BLD AUTO: 9.8 FL (ref 9–12.9)
POTASSIUM SERPL-SCNC: 4.2 MMOL/L (ref 3.6–5.5)
PROT SERPL-MCNC: 6.1 G/DL (ref 6–8.2)
RBC # BLD AUTO: 4.26 M/UL (ref 4.7–6.1)
SODIUM SERPL-SCNC: 138 MMOL/L (ref 135–145)
TRIGL SERPL-MCNC: 66 MG/DL (ref 0–149)
TROPONIN T SERPL-MCNC: 20 NG/L (ref 6–19)
TROPONIN T SERPL-MCNC: 20 NG/L (ref 6–19)
TSH SERPL DL<=0.005 MIU/L-ACNC: 1.24 UIU/ML (ref 0.38–5.33)
WBC # BLD AUTO: 9 K/UL (ref 4.8–10.8)

## 2023-05-11 PROCEDURE — 93018 CV STRESS TEST I&R ONLY: CPT | Performed by: INTERNAL MEDICINE

## 2023-05-11 PROCEDURE — 80061 LIPID PANEL: CPT

## 2023-05-11 PROCEDURE — 93010 ELECTROCARDIOGRAM REPORT: CPT | Performed by: INTERNAL MEDICINE

## 2023-05-11 PROCEDURE — A9270 NON-COVERED ITEM OR SERVICE: HCPCS | Performed by: HOSPITALIST

## 2023-05-11 PROCEDURE — 78452 HT MUSCLE IMAGE SPECT MULT: CPT

## 2023-05-11 PROCEDURE — 94760 N-INVAS EAR/PLS OXIMETRY 1: CPT

## 2023-05-11 PROCEDURE — A9270 NON-COVERED ITEM OR SERVICE: HCPCS

## 2023-05-11 PROCEDURE — 80053 COMPREHEN METABOLIC PANEL: CPT

## 2023-05-11 PROCEDURE — 93306 TTE W/DOPPLER COMPLETE: CPT | Mod: 26 | Performed by: INTERNAL MEDICINE

## 2023-05-11 PROCEDURE — 700102 HCHG RX REV CODE 250 W/ 637 OVERRIDE(OP): Performed by: HOSPITALIST

## 2023-05-11 PROCEDURE — G0378 HOSPITAL OBSERVATION PER HR: HCPCS

## 2023-05-11 PROCEDURE — 36415 COLL VENOUS BLD VENIPUNCTURE: CPT

## 2023-05-11 PROCEDURE — 83735 ASSAY OF MAGNESIUM: CPT

## 2023-05-11 PROCEDURE — 99254 IP/OBS CNSLTJ NEW/EST MOD 60: CPT | Performed by: INTERNAL MEDICINE

## 2023-05-11 PROCEDURE — 99223 1ST HOSP IP/OBS HIGH 75: CPT | Performed by: HOSPITALIST

## 2023-05-11 PROCEDURE — 85027 COMPLETE CBC AUTOMATED: CPT

## 2023-05-11 PROCEDURE — 78452 HT MUSCLE IMAGE SPECT MULT: CPT | Mod: 26 | Performed by: INTERNAL MEDICINE

## 2023-05-11 PROCEDURE — 93306 TTE W/DOPPLER COMPLETE: CPT

## 2023-05-11 PROCEDURE — 700102 HCHG RX REV CODE 250 W/ 637 OVERRIDE(OP)

## 2023-05-11 PROCEDURE — 84443 ASSAY THYROID STIM HORMONE: CPT

## 2023-05-11 PROCEDURE — 770020 HCHG ROOM/CARE - TELE (206)

## 2023-05-11 PROCEDURE — 93005 ELECTROCARDIOGRAM TRACING: CPT | Performed by: HOSPITALIST

## 2023-05-11 PROCEDURE — 84484 ASSAY OF TROPONIN QUANT: CPT

## 2023-05-11 RX ORDER — AMINOPHYLLINE 25 MG/ML
100 INJECTION, SOLUTION INTRAVENOUS
Status: DISCONTINUED | OUTPATIENT
Start: 2023-05-11 | End: 2023-05-11 | Stop reason: CLARIF

## 2023-05-11 RX ORDER — AMOXICILLIN 250 MG
2 CAPSULE ORAL 2 TIMES DAILY
Status: CANCELLED | OUTPATIENT
Start: 2023-05-11

## 2023-05-11 RX ORDER — ACETAMINOPHEN 325 MG/1
650 TABLET ORAL EVERY 6 HOURS PRN
Status: DISCONTINUED | OUTPATIENT
Start: 2023-05-11 | End: 2023-05-13 | Stop reason: HOSPADM

## 2023-05-11 RX ORDER — BISACODYL 10 MG
10 SUPPOSITORY, RECTAL RECTAL
Status: DISCONTINUED | OUTPATIENT
Start: 2023-05-11 | End: 2023-05-13 | Stop reason: HOSPADM

## 2023-05-11 RX ORDER — LISINOPRIL 5 MG/1
TABLET ORAL
Status: COMPLETED
Start: 2023-05-11 | End: 2023-05-11

## 2023-05-11 RX ORDER — POLYETHYLENE GLYCOL 3350 17 G/17G
1 POWDER, FOR SOLUTION ORAL
Status: CANCELLED | OUTPATIENT
Start: 2023-05-11

## 2023-05-11 RX ORDER — HYDRALAZINE HYDROCHLORIDE 20 MG/ML
10 INJECTION INTRAMUSCULAR; INTRAVENOUS EVERY 4 HOURS PRN
Status: DISCONTINUED | OUTPATIENT
Start: 2023-05-11 | End: 2023-05-13 | Stop reason: HOSPADM

## 2023-05-11 RX ORDER — NITROGLYCERIN 0.4 MG/1
0.4 TABLET SUBLINGUAL
Status: DISCONTINUED | OUTPATIENT
Start: 2023-05-11 | End: 2023-05-11

## 2023-05-11 RX ORDER — ATORVASTATIN CALCIUM 80 MG/1
TABLET, FILM COATED ORAL
Status: COMPLETED
Start: 2023-05-11 | End: 2023-05-11

## 2023-05-11 RX ORDER — ATORVASTATIN CALCIUM 80 MG/1
80 TABLET, FILM COATED ORAL EVERY EVENING
Status: DISCONTINUED | OUTPATIENT
Start: 2023-05-11 | End: 2023-05-13 | Stop reason: HOSPADM

## 2023-05-11 RX ORDER — HYDRALAZINE HYDROCHLORIDE 20 MG/ML
10 INJECTION INTRAMUSCULAR; INTRAVENOUS EVERY 4 HOURS PRN
Status: CANCELLED | OUTPATIENT
Start: 2023-05-11

## 2023-05-11 RX ORDER — NITROGLYCERIN 0.4 MG/1
0.4 TABLET SUBLINGUAL
Status: CANCELLED | OUTPATIENT
Start: 2023-05-11

## 2023-05-11 RX ORDER — POLYETHYLENE GLYCOL 3350 17 G/17G
1 POWDER, FOR SOLUTION ORAL
Status: DISCONTINUED | OUTPATIENT
Start: 2023-05-11 | End: 2023-05-13 | Stop reason: HOSPADM

## 2023-05-11 RX ORDER — REGADENOSON 0.08 MG/ML
0.4 INJECTION, SOLUTION INTRAVENOUS ONCE
Status: DISCONTINUED | OUTPATIENT
Start: 2023-05-11 | End: 2023-05-11 | Stop reason: CLARIF

## 2023-05-11 RX ORDER — LISINOPRIL 5 MG/1
2.5 TABLET ORAL
Status: DISCONTINUED | OUTPATIENT
Start: 2023-05-11 | End: 2023-05-13 | Stop reason: HOSPADM

## 2023-05-11 RX ORDER — AMOXICILLIN 250 MG
2 CAPSULE ORAL 2 TIMES DAILY
Status: DISCONTINUED | OUTPATIENT
Start: 2023-05-11 | End: 2023-05-13 | Stop reason: HOSPADM

## 2023-05-11 RX ORDER — BISACODYL 10 MG
10 SUPPOSITORY, RECTAL RECTAL
Status: CANCELLED | OUTPATIENT
Start: 2023-05-11

## 2023-05-11 RX ORDER — NITROGLYCERIN 0.4 MG/1
0.4 TABLET SUBLINGUAL
Status: DISCONTINUED | OUTPATIENT
Start: 2023-05-11 | End: 2023-05-13 | Stop reason: HOSPADM

## 2023-05-11 RX ORDER — ACETAMINOPHEN 325 MG/1
650 TABLET ORAL EVERY 6 HOURS PRN
Status: CANCELLED | OUTPATIENT
Start: 2023-05-11

## 2023-05-11 RX ADMIN — LISINOPRIL 2.5 MG: 5 TABLET ORAL at 16:59

## 2023-05-11 RX ADMIN — ATORVASTATIN CALCIUM 80 MG: 80 TABLET, FILM COATED ORAL at 16:59

## 2023-05-11 RX ADMIN — NITROGLYCERIN 0.4 MG: 0.4 TABLET, ORALLY DISINTEGRATING SUBLINGUAL at 19:53

## 2023-05-11 RX ADMIN — METOPROLOL TARTRATE 12.5 MG: 25 TABLET, FILM COATED ORAL at 16:59

## 2023-05-11 RX ADMIN — ASPIRIN 81 MG: 81 TABLET, COATED ORAL at 06:52

## 2023-05-11 ASSESSMENT — ENCOUNTER SYMPTOMS
CHILLS: 0
HEMOPTYSIS: 0
PALPITATIONS: 0
ORTHOPNEA: 0
SPUTUM PRODUCTION: 0
NAUSEA: 0
COUGH: 0
VOMITING: 0
DIZZINESS: 0
ABDOMINAL PAIN: 0

## 2023-05-11 ASSESSMENT — LIFESTYLE VARIABLES
ALCOHOL_USE: YES
TOTAL SCORE: 0
HAVE PEOPLE ANNOYED YOU BY CRITICIZING YOUR DRINKING: NO
TOTAL SCORE: 0
HOW MANY TIMES IN THE PAST YEAR HAVE YOU HAD 5 OR MORE DRINKS IN A DAY: 0
AVERAGE NUMBER OF DAYS PER WEEK YOU HAVE A DRINK CONTAINING ALCOHOL: 5
ON A TYPICAL DAY WHEN YOU DRINK ALCOHOL HOW MANY DRINKS DO YOU HAVE: 2
HAVE YOU EVER FELT YOU SHOULD CUT DOWN ON YOUR DRINKING: NO
EVER FELT BAD OR GUILTY ABOUT YOUR DRINKING: NO
CONSUMPTION TOTAL: NEGATIVE
EVER HAD A DRINK FIRST THING IN THE MORNING TO STEADY YOUR NERVES TO GET RID OF A HANGOVER: NO
TOTAL SCORE: 0

## 2023-05-11 ASSESSMENT — COGNITIVE AND FUNCTIONAL STATUS - GENERAL
SUGGESTED CMS G CODE MODIFIER DAILY ACTIVITY: CH
SUGGESTED CMS G CODE MODIFIER MOBILITY: CH
MOBILITY SCORE: 24
DAILY ACTIVITIY SCORE: 24

## 2023-05-11 ASSESSMENT — FIBROSIS 4 INDEX: FIB4 SCORE: 0.59

## 2023-05-11 ASSESSMENT — PATIENT HEALTH QUESTIONNAIRE - PHQ9
1. LITTLE INTEREST OR PLEASURE IN DOING THINGS: NOT AT ALL
SUM OF ALL RESPONSES TO PHQ9 QUESTIONS 1 AND 2: 0
2. FEELING DOWN, DEPRESSED, IRRITABLE, OR HOPELESS: NOT AT ALL

## 2023-05-11 ASSESSMENT — PAIN DESCRIPTION - PAIN TYPE: TYPE: ACUTE PAIN

## 2023-05-11 NOTE — PROGRESS NOTES
[facility]PITAL[facility] TRIAGE OFFICER DIRECT ADMISSION REPORT  Transferring facility: [facility]  Transferring physician: Dr Gonzales  Transferring facility/physician contact number:   Chief complaint: chest pain  Pertinent history & patient course: Abnormal stress test, see Dr. Gonzales's note. Transferring for cath  Pertinent imaging & lab results: see epic  Code Status: Full per transferring provider, I personally verified with the transferring provider patient's code status and the transferring provider has confirmed this with the patient.  Further work up or recommendations per triage officer prior to transfer: n/a  Consultants called prior to transfer and pertinent input from consultants: Cardiology  Patient accepted for transfer: Yes  Consultants to be called upon arrival: Cardiology  Admission status: Inpatient.   Floor requested: Tele  If ICU transfer, name of intensivist case discussed with and pertinent input from critical care: n/a    Please inform the triage officer upon arrival of the patient to [facility] for assignment of a hospitalist to perform admission.     For any question or concerns regarding the care of this patient, please reach out to the assigned hospitalist.

## 2023-05-11 NOTE — DISCHARGE SUMMARY
Discharge Summary    CHIEF COMPLAINT ON ADMISSION  Chief Complaint   Patient presents with    Chest Pain     Mid sternal and radiates to left chest       Reason for Admission  Chest Pain     Admission Date  5/10/2023    CODE STATUS  Full Code    HPI & HOSPITAL COURSE  This is a 42 y.o. male here with chest pain.     Colt Correa has past medical history that includes methamphetamine use.  He presented to the emergency room on 5/10/2023 with exertional chest pain and shortness of breath.  His methamphetamine screen was negative.  He was hospitalized for telemetry monitoring and cardiac work-up.    Nuclear medicine stress test has been completed, the stress test is abnormal and the patient had severe chest pain with exercise and the nuclear images revealed a high risk pattern concerning for anterior ischemia.  Patient will be transferred to [facility] for evaluation for cardiac catheterization.  I did discuss with the patient that if he did have intervention he would be required to take dual antiplatelet therapy as well as other medications regularly.  He expresses understanding and is agreeable to daily medications     Therefore, he is discharged in fair and stable condition to a short-term general hosptial for inpatient care.    Discharge Date  5/11/2023    FOLLOW UP ITEMS POST DISCHARGE  To be determined at [facility]    DISCHARGE DIAGNOSES  Principal Problem:    Pain in the chest (POA: Yes)  Active Problems:    Methamphetamine abuse in remission (HCC) (POA: Yes)    Elevated blood pressure reading (POA: Yes)    Abnormal EKG (POA: Yes)    Elevated brain natriuretic peptide (BNP) level (POA: Yes)    Chest pain (POA: Yes)  Resolved Problems:    * No resolved hospital problems. *      FOLLOW UP  No future appointments.  No follow-up provider specified.    MEDICATIONS ON DISCHARGE     Medication List        ASK your doctor about these medications        Instructions    acetaminophen 500 MG Tabs  Commonly known as: TYLENOL   Take 1,000 mg by mouth every 6 hours as needed for Moderate Pain. Indications: Pain  Dose: 1,000 mg     amoxicillin 500 MG Caps  Commonly known as: AMOXIL   Take 500 mg by mouth 3 times a day. Dental pain  Dose: 500 mg     ibuprofen 200 MG Tabs  Commonly known as: MOTRIN   Take 600 mg by mouth every 6 hours as needed for Mild Pain.  Dose: 600 mg              Allergies  Allergies   Allergen Reactions    Benadryl [Altaryl] Unspecified     twitching    Dramamine [Dimenhydrinate] Unspecified     twitching    Oxycodone Nausea       DIET  Orders Placed This Encounter   Procedures    Diet Order Diet: Regular     Standing Status:   Standing     Number of Occurrences:   1     Order Specific Question:   Diet:     Answer:   Regular [1]       ACTIVITY  As tolerated.  Weight bearing as tolerated    CONSULTATIONS  Cardiology    PROCEDURES    Echocardiogram 5/11/2023:  No prior study is available for comparison.   Normal left ventricular chamber size.  The left ventricular ejection fraction is visually estimated to be 60%.  Normal inferior vena cava size and inspiratory collapse.  Estimated right ventricular systolic pressure is 30 mmHg.    Nuclear medicine stress test 5/11/2023:   NUCLEAR IMAGING INTERPRETATION   Large reversible anterior and anteroseptal defect from mid to apical LV    consistent with ischemia.   LV ejection fraction = 51%.   High risk stress test findings.   Chest pain with exertion.   ECG INTERPRETATION   Positive stress ECG for ischemia. Exercise capacity very good at >10 METS.    LABORATORY  Lab Results   Component Value Date    SODIUM 138 05/11/2023    POTASSIUM 4.2 05/11/2023    CHLORIDE 103 05/11/2023    CO2 28 05/11/2023    GLUCOSE 92 05/11/2023    BUN 17 05/11/2023    CREATININE 1.06 05/11/2023    CREATININE 1.1 01/11/2009        Lab Results   Component Value Date    WBC 9.0 05/11/2023    HEMOGLOBIN 13.5 (L) 05/11/2023    HEMATOCRIT 40.7 (L)  05/11/2023    PLATELETCT 300 05/11/2023        Total time of the discharge process exceeds 42 minutes.

## 2023-05-11 NOTE — DISCHARGE PLANNING
CM RN received notification that pt has orders to transfer to [facility]. PCS form completed and faxed to [facility]. [employer] packet started. CM RN reached out to PFA at [id number] to update pts insurance if any.      CM RN met with pt at bedside, pt stating no insurance at this time. Pt reports income $0. CM RN reached out to PFA to screen for Medicaid.     Pts emergency contacts collected. Aby oneill [phone] and Floresita mcgee [phone].

## 2023-05-11 NOTE — HOSPITAL COURSE
Colt Correa has past medical history that includes methamphetamine use.  He presented to the emergency room on 5/10/2023 with chest pain and shortness of breath.  His methamphetamine screen was negative.  He was hospitalized for telemetry monitoring and cardiac work-up.

## 2023-05-11 NOTE — ASSESSMENT & PLAN NOTE
Positive stress testing.  Left heart catheterization showing 95% Stenosis of the LAD.  Drug-eluting stent placed.    Continue aspirin and Plavix  Continue high intensity atorvastatin  Continue metoprolol 25 mg twice a day  Continue lisinopril 2.5 mg daily

## 2023-05-11 NOTE — H&P
Hospital Medicine History & Physical Note    Date of Service  5/11/2023    Primary Care Physician  Una Monge P.A.-C.    Consultants  cardiology    Specialist Names: Dr. Tran    Code Status  Full Code    Chief Complaint  Chest Pain    History of Presenting Illness  Colt Correa is a 42 y.o. male who presented 5/11/2023 with chest pain.    I have reviewed some of the recent provider documentation available to me in the patient's medical chart.  Records are briefly summarized: Colt Correa has a history of asthma and gastroesophageal reflux disease.  Patient has history of addiction and is currently in remission.  He is homeless and resides at the shelter.  He underwent a nuclear medicine stress test in 2013, results are not posted in her system, the patient does not recall whether the stress test was abnormal but he has been told that he had a enlarged ventricle in the past, this is one of the reasons he stopped using meth.    In the weeks prior to admission the patient has developed a very predictable pattern of chest pain.  He says that when he walks especially uphill that he gets chest pain in the front of his chest which feels like a basketball squeezing under his sternum.  There are some radiation to the bilateral shoulders and jaw.  The pain typically resolves when he lies down to rest.  Patient has some associated shortness of breath.  Patient was admitted to [facility] today underwent a nuclear medicine stress test.  He had a treadmill exam and during the exam had the same symptoms occur.  It was contacted by the reading cardiologist who informed me that it was a high risk stress test result.  I discussed the results with the patient he is agreeable to transfer to [facility] for cardiac evaluation and probable cardiac catheterization.  We discussed the need for medical management especially the patient were to have intervention.  He understands that he would  have to take dual antiplatelet therapy if a stent was placed.    I discussed the plan of care with patient and bedside RN. And cardiology consultant    Review of Systems  Review of Systems   Constitutional:  Negative for chills and malaise/fatigue.   Respiratory:  Negative for cough, hemoptysis and sputum production.    Cardiovascular:  Positive for chest pain. Negative for palpitations and orthopnea.   Gastrointestinal:  Negative for abdominal pain, nausea and vomiting.   Skin:  Negative for itching and rash.   Neurological:  Negative for dizziness.   All other systems reviewed and are negative.      Past Medical History   has a past medical history of ASTHMA, GERD (gastroesophageal reflux disease), Methamphetamine abuse (HCC) (3/16/2013), and Tobacco use.    Surgical History   has a past surgical history that includes tonsillectomy and adenoidectomy and other (comments).     Family History  family history includes Heart Disease in his father, paternal uncle, and another family member.   Family history reviewed with patient. There is family history that is pertinent to the chief complaint.     Social History   reports that he has been smoking cigarettes. He has been smoking an average of .5 packs per day. He has never used smokeless tobacco. He reports current alcohol use. He reports current drug use. Drug: Inhaled.    Allergies  Allergies   Allergen Reactions    Benadryl [Altaryl] Unspecified     twitching    Dramamine [Dimenhydrinate] Unspecified     twitching    Oxycodone Nausea       Medications  Prior to Admission Medications   Prescriptions Last Dose Informant Patient Reported? Taking?   acetaminophen (TYLENOL) 500 MG Tab  Patient Yes No   Sig: Take 1,000 mg by mouth every 6 hours as needed for Moderate Pain. Indications: Pain   amoxicillin (AMOXIL) 500 MG Cap  Patient Yes No   Sig: Take 500 mg by mouth 3 times a day. Dental pain   ibuprofen (MOTRIN) 200 MG Tab  Patient Yes No   Sig: Take 600 mg by mouth  every 6 hours as needed for Mild Pain.      Facility-Administered Medications: None       Physical Exam  Temp:  [36.2 °C (97.2 °F)-36.7 °C (98.1 °F)] 36.7 °C (98.1 °F)  Pulse:  [53-70] 60  Resp:  [14-18] 14  BP: (103-146)/(57-85) 146/85  SpO2:  [97 %-100 %] 98 %  Blood Pressure: (!) 146/85   Temperature: 36.7 °C (98.1 °F)   Pulse: 60   Respiration: 14   Pulse Oximetry: 98 %       Physical Exam  Constitutional:       General: He is not in acute distress.     Appearance: Normal appearance. He is normal weight.   HENT:      Head: Normocephalic and atraumatic.      Right Ear: External ear normal.      Left Ear: External ear normal.      Nose: Nose normal.      Mouth/Throat:      Mouth: Mucous membranes are moist.      Pharynx: Oropharynx is clear.   Eyes:      Extraocular Movements: Extraocular movements intact.      Conjunctiva/sclera: Conjunctivae normal.      Pupils: Pupils are equal, round, and reactive to light.   Cardiovascular:      Rate and Rhythm: Normal rate and regular rhythm.      Pulses: Normal pulses.      Heart sounds: No murmur heard.  Pulmonary:      Effort: Pulmonary effort is normal.      Breath sounds: Normal breath sounds.   Abdominal:      General: Abdomen is flat. Bowel sounds are normal.      Palpations: Abdomen is soft.   Musculoskeletal:         General: Normal range of motion.      Cervical back: Normal range of motion and neck supple.   Skin:     General: Skin is warm and dry.      Capillary Refill: Capillary refill takes less than 2 seconds.      Coloration: Skin is not jaundiced.   Neurological:      General: No focal deficit present.      Mental Status: He is alert and oriented to person, place, and time.      Cranial Nerves: No cranial nerve deficit.      Gait: Gait normal.   Psychiatric:         Mood and Affect: Mood normal.         Behavior: Behavior normal.         Laboratory:  Recent Labs     05/10/23  0911 05/11/23  0021   WBC 9.5 9.0   RBC 4.34* 4.26*   HEMOGLOBIN 13.8* 13.5*    HEMATOCRIT 40.7* 40.7*   MCV 93.8 95.5   MCH 31.8 31.7   MCHC 33.9 33.2*   RDW 46.0 47.8   PLATELETCT 296 300   MPV 9.6 9.8     Recent Labs     05/10/23  0911 05/11/23  0021   SODIUM 139 138   POTASSIUM 4.1 4.2   CHLORIDE 105 103   CO2 26 28   GLUCOSE 94 92   BUN 10 17   CREATININE 0.76 1.06   CALCIUM 9.2 9.0     Recent Labs     05/10/23  0911 05/11/23  0021   ALTSGPT 43 37   ASTSGOT 29 25   ALKPHOSPHAT 70 68   TBILIRUBIN 1.0 0.6   GLUCOSE 94 92         Recent Labs     05/10/23  0911   NTPROBNP 292*     Recent Labs     05/11/23  0021   TRIGLYCERIDE 66   HDL 67   LDL 56     Recent Labs     05/10/23  2000 05/11/23  0021 05/11/23  0428   TROPONINT 23* 20* 20*       Imaging:  No orders to display       Chest x-ray 5/10/2023, results interpreted myself, there is no acute cardiopulmonary abnormality  Discussed results of nuclear medicine stress test with reading provider Dr. Quijano, we discussed that the result is a high risk positive study      Assessment/Plan:  Justification for Admission Status  I anticipate this patient will require at least two midnights for appropriate medical management, necessitating inpatient admission because the patient requires invasive work-up and cardiac intervention with cardiac catheterization    Patient will need a Telemetry bed on MEDICAL service .  The need is secondary to unstable angina.    * Unstable angina (HCC)- (present on admission)  Assessment & Plan  Patient presents with atypical pattern of angina, chest pain with exertion, relieved at rest  He has a high risk positive stress test  Start aspirin, atorvastatin  Cardiology consult  At risk for arrhythmias, continuous hemodynamic monitoring on telemetry  N.p.o. at midnight in anticipation of probable cardiac catheterization tomorrow    Abnormal EKG- (present on admission)  Assessment & Plan  Check EKG if patient has recurrence of chest pain    Methamphetamine abuse in remission (HCC)- (present on admission)  Assessment &  Plan  Patient reports abstinence  Admission urine toxicology screen is negative        VTE prophylaxis: SCDs/TEDs

## 2023-05-11 NOTE — CARE PLAN
Problem: Pain - Standard  Goal: Alleviation of pain or a reduction in pain to the patient’s comfort goal  Outcome: Progressing     Problem: Knowledge Deficit - Standard  Goal: Patient and family/care givers will demonstrate understanding of plan of care, disease process/condition, diagnostic tests and medications  Outcome: Progressing   The patient is Stable - Low risk of patient condition declining or worsening    Shift Goals  Clinical Goals: perform stress test  Patient Goals: d/c soon

## 2023-05-12 ENCOUNTER — APPOINTMENT (OUTPATIENT)
Dept: CARDIOLOGY | Facility: MEDICAL CENTER | Age: 43
DRG: 247 | End: 2023-05-12
Attending: INTERNAL MEDICINE
Payer: MEDICAID

## 2023-05-12 PROBLEM — Z59.00 HOMELESSNESS: Status: ACTIVE | Noted: 2023-05-12

## 2023-05-12 PROBLEM — Z95.820 S/P ANGIOPLASTY WITH STENT: Status: ACTIVE | Noted: 2023-05-12

## 2023-05-12 PROBLEM — I20.0 UNSTABLE ANGINA (HCC): Status: RESOLVED | Noted: 2023-05-11 | Resolved: 2023-05-12

## 2023-05-12 PROBLEM — I25.110 CORONARY ARTERY DISEASE INVOLVING NATIVE CORONARY ARTERY OF NATIVE HEART WITH UNSTABLE ANGINA PECTORIS (HCC): Status: ACTIVE | Noted: 2023-05-12

## 2023-05-12 PROBLEM — R94.31 ABNORMAL EKG: Status: RESOLVED | Noted: 2023-05-10 | Resolved: 2023-05-12

## 2023-05-12 LAB
ACT BLD: 215 SEC (ref 74–137)
ACT BLD: 227 SEC (ref 74–137)
CHOLEST SERPL-MCNC: 134 MG/DL (ref 100–199)
EKG IMPRESSION: NORMAL
HDLC SERPL-MCNC: 62 MG/DL
LDLC SERPL CALC-MCNC: 55 MG/DL
TRIGL SERPL-MCNC: 84 MG/DL (ref 0–149)

## 2023-05-12 PROCEDURE — 99232 SBSQ HOSP IP/OBS MODERATE 35: CPT | Performed by: STUDENT IN AN ORGANIZED HEALTH CARE EDUCATION/TRAINING PROGRAM

## 2023-05-12 PROCEDURE — B2111ZZ FLUOROSCOPY OF MULTIPLE CORONARY ARTERIES USING LOW OSMOLAR CONTRAST: ICD-10-PCS | Performed by: INTERNAL MEDICINE

## 2023-05-12 PROCEDURE — 93005 ELECTROCARDIOGRAM TRACING: CPT | Performed by: INTERNAL MEDICINE

## 2023-05-12 PROCEDURE — 700111 HCHG RX REV CODE 636 W/ 250 OVERRIDE (IP): Performed by: STUDENT IN AN ORGANIZED HEALTH CARE EDUCATION/TRAINING PROGRAM

## 2023-05-12 PROCEDURE — 700117 HCHG RX CONTRAST REV CODE 255: Performed by: INTERNAL MEDICINE

## 2023-05-12 PROCEDURE — B240ZZ3 ULTRASONOGRAPHY OF SINGLE CORONARY ARTERY, INTRAVASCULAR: ICD-10-PCS | Performed by: INTERNAL MEDICINE

## 2023-05-12 PROCEDURE — 700102 HCHG RX REV CODE 250 W/ 637 OVERRIDE(OP)

## 2023-05-12 PROCEDURE — 700102 HCHG RX REV CODE 250 W/ 637 OVERRIDE(OP): Performed by: HOSPITALIST

## 2023-05-12 PROCEDURE — 92978 ENDOLUMINL IVUS OCT C 1ST: CPT | Mod: 26,LD | Performed by: INTERNAL MEDICINE

## 2023-05-12 PROCEDURE — 700101 HCHG RX REV CODE 250

## 2023-05-12 PROCEDURE — 027034Z DILATION OF CORONARY ARTERY, ONE ARTERY WITH DRUG-ELUTING INTRALUMINAL DEVICE, PERCUTANEOUS APPROACH: ICD-10-PCS | Performed by: INTERNAL MEDICINE

## 2023-05-12 PROCEDURE — 92928 PRQ TCAT PLMT NTRAC ST 1 LES: CPT | Mod: LD | Performed by: INTERNAL MEDICINE

## 2023-05-12 PROCEDURE — 93458 L HRT ARTERY/VENTRICLE ANGIO: CPT | Mod: 26,59 | Performed by: INTERNAL MEDICINE

## 2023-05-12 PROCEDURE — 80061 LIPID PANEL: CPT

## 2023-05-12 PROCEDURE — 85347 COAGULATION TIME ACTIVATED: CPT | Mod: 91

## 2023-05-12 PROCEDURE — A9270 NON-COVERED ITEM OR SERVICE: HCPCS

## 2023-05-12 PROCEDURE — 93010 ELECTROCARDIOGRAM REPORT: CPT | Mod: 59 | Performed by: INTERNAL MEDICINE

## 2023-05-12 PROCEDURE — 770020 HCHG ROOM/CARE - TELE (206)

## 2023-05-12 PROCEDURE — A9270 NON-COVERED ITEM OR SERVICE: HCPCS | Performed by: INTERNAL MEDICINE

## 2023-05-12 PROCEDURE — 99152 MOD SED SAME PHYS/QHP 5/>YRS: CPT | Performed by: INTERNAL MEDICINE

## 2023-05-12 PROCEDURE — A9270 NON-COVERED ITEM OR SERVICE: HCPCS | Performed by: HOSPITALIST

## 2023-05-12 PROCEDURE — 4A023N7 MEASUREMENT OF CARDIAC SAMPLING AND PRESSURE, LEFT HEART, PERCUTANEOUS APPROACH: ICD-10-PCS | Performed by: INTERNAL MEDICINE

## 2023-05-12 PROCEDURE — 700102 HCHG RX REV CODE 250 W/ 637 OVERRIDE(OP): Performed by: INTERNAL MEDICINE

## 2023-05-12 PROCEDURE — 92978 ENDOLUMINL IVUS OCT C 1ST: CPT

## 2023-05-12 PROCEDURE — 700111 HCHG RX REV CODE 636 W/ 250 OVERRIDE (IP)

## 2023-05-12 RX ORDER — ENOXAPARIN SODIUM 100 MG/ML
40 INJECTION SUBCUTANEOUS DAILY
Status: DISCONTINUED | OUTPATIENT
Start: 2023-05-12 | End: 2023-05-13 | Stop reason: HOSPADM

## 2023-05-12 RX ORDER — MIDAZOLAM HYDROCHLORIDE 1 MG/ML
INJECTION INTRAMUSCULAR; INTRAVENOUS
Status: COMPLETED
Start: 2023-05-12 | End: 2023-05-12

## 2023-05-12 RX ORDER — HEPARIN SODIUM 1000 [USP'U]/ML
INJECTION, SOLUTION INTRAVENOUS; SUBCUTANEOUS
Status: COMPLETED
Start: 2023-05-12 | End: 2023-05-12

## 2023-05-12 RX ORDER — NITROGLYCERIN 0.4 MG/1
0.4 TABLET SUBLINGUAL PRN
Qty: 25 TABLET | Refills: 1 | Status: SHIPPED | OUTPATIENT
Start: 2023-05-12 | End: 2023-08-29

## 2023-05-12 RX ORDER — ATORVASTATIN CALCIUM 20 MG/1
20 TABLET, FILM COATED ORAL DAILY
Qty: 30 TABLET | Refills: 1 | Status: SHIPPED | OUTPATIENT
Start: 2023-05-12 | End: 2023-08-18 | Stop reason: SDUPTHER

## 2023-05-12 RX ORDER — METOPROLOL SUCCINATE 50 MG/1
50 TABLET, EXTENDED RELEASE ORAL DAILY
Qty: 30 TABLET | Refills: 2 | Status: SHIPPED | OUTPATIENT
Start: 2023-05-12 | End: 2023-05-13

## 2023-05-12 RX ORDER — CLOPIDOGREL BISULFATE 75 MG/1
75 TABLET ORAL DAILY
Status: DISCONTINUED | OUTPATIENT
Start: 2023-05-13 | End: 2023-05-13 | Stop reason: HOSPADM

## 2023-05-12 RX ORDER — LISINOPRIL 2.5 MG/1
2.5 TABLET ORAL DAILY
Qty: 30 TABLET | Refills: 2 | Status: SHIPPED | OUTPATIENT
Start: 2023-05-13 | End: 2023-08-18 | Stop reason: SDUPTHER

## 2023-05-12 RX ORDER — VERAPAMIL HYDROCHLORIDE 2.5 MG/ML
INJECTION, SOLUTION INTRAVENOUS
Status: COMPLETED
Start: 2023-05-12 | End: 2023-05-12

## 2023-05-12 RX ORDER — ASPIRIN 81 MG/1
81 TABLET ORAL DAILY
Qty: 30 TABLET | Refills: 2 | Status: SHIPPED | OUTPATIENT
Start: 2023-05-13 | End: 2023-08-18 | Stop reason: SDUPTHER

## 2023-05-12 RX ORDER — CLOPIDOGREL BISULFATE 75 MG/1
75 TABLET ORAL DAILY
Qty: 30 TABLET | Refills: 2 | Status: SHIPPED | OUTPATIENT
Start: 2023-05-13 | End: 2023-08-18 | Stop reason: SDUPTHER

## 2023-05-12 RX ORDER — PRASUGREL 10 MG/1
TABLET, FILM COATED ORAL
Status: COMPLETED
Start: 2023-05-12 | End: 2023-05-12

## 2023-05-12 RX ORDER — LIDOCAINE HYDROCHLORIDE 20 MG/ML
INJECTION, SOLUTION INFILTRATION; PERINEURAL
Status: COMPLETED
Start: 2023-05-12 | End: 2023-05-12

## 2023-05-12 RX ORDER — HEPARIN SODIUM 200 [USP'U]/100ML
INJECTION, SOLUTION INTRAVENOUS
Status: COMPLETED
Start: 2023-05-12 | End: 2023-05-12

## 2023-05-12 RX ORDER — PRASUGREL 10 MG/1
60 TABLET, FILM COATED ORAL ONCE
Status: DISCONTINUED | OUTPATIENT
Start: 2023-05-12 | End: 2023-05-12

## 2023-05-12 RX ADMIN — ASPIRIN 81 MG: 81 TABLET, COATED ORAL at 05:37

## 2023-05-12 RX ADMIN — LISINOPRIL 2.5 MG: 5 TABLET ORAL at 05:37

## 2023-05-12 RX ADMIN — FENTANYL CITRATE 25 MCG: 50 INJECTION, SOLUTION INTRAMUSCULAR; INTRAVENOUS at 12:02

## 2023-05-12 RX ADMIN — ENOXAPARIN SODIUM 40 MG: 100 INJECTION SUBCUTANEOUS at 19:43

## 2023-05-12 RX ADMIN — PRASUGREL 60 MG: 10 TABLET, FILM COATED ORAL at 11:41

## 2023-05-12 RX ADMIN — VERAPAMIL HYDROCHLORIDE 2.5 MG: 2.5 INJECTION, SOLUTION INTRAVENOUS at 10:54

## 2023-05-12 RX ADMIN — FENTANYL CITRATE 100 MCG: 50 INJECTION, SOLUTION INTRAMUSCULAR; INTRAVENOUS at 11:47

## 2023-05-12 RX ADMIN — LIDOCAINE HYDROCHLORIDE: 20 INJECTION, SOLUTION INFILTRATION; PERINEURAL at 10:54

## 2023-05-12 RX ADMIN — HEPARIN SODIUM: 1000 INJECTION, SOLUTION INTRAVENOUS; SUBCUTANEOUS at 10:54

## 2023-05-12 RX ADMIN — METOPROLOL TARTRATE 25 MG: 25 TABLET, FILM COATED ORAL at 05:37

## 2023-05-12 RX ADMIN — ATORVASTATIN CALCIUM 80 MG: 80 TABLET, FILM COATED ORAL at 17:20

## 2023-05-12 RX ADMIN — METOPROLOL TARTRATE 25 MG: 25 TABLET, FILM COATED ORAL at 17:20

## 2023-05-12 RX ADMIN — IOHEXOL 80 ML: 350 INJECTION, SOLUTION INTRAVENOUS at 11:15

## 2023-05-12 RX ADMIN — HEPARIN SODIUM 4000 UNITS: 1000 INJECTION, SOLUTION INTRAVENOUS; SUBCUTANEOUS at 12:08

## 2023-05-12 RX ADMIN — MIDAZOLAM HYDROCHLORIDE 2 MG: 1 INJECTION, SOLUTION INTRAMUSCULAR; INTRAVENOUS at 11:47

## 2023-05-12 RX ADMIN — HEPARIN SODIUM 2000 UNITS: 200 INJECTION, SOLUTION INTRAVENOUS at 10:53

## 2023-05-12 RX ADMIN — HEPARIN SODIUM 4000 UNITS: 1000 INJECTION, SOLUTION INTRAVENOUS; SUBCUTANEOUS at 12:01

## 2023-05-12 RX ADMIN — NITROGLYCERIN 10 ML: 20 INJECTION INTRAVENOUS at 10:54

## 2023-05-12 RX ADMIN — MIDAZOLAM HYDROCHLORIDE 0.5 MG: 1 INJECTION, SOLUTION INTRAMUSCULAR; INTRAVENOUS at 12:02

## 2023-05-12 ASSESSMENT — ENCOUNTER SYMPTOMS
DIZZINESS: 0
VOMITING: 0
FEVER: 0
COUGH: 0
PALPITATIONS: 0
SHORTNESS OF BREATH: 0
HEADACHES: 0
CHILLS: 0
NAUSEA: 0

## 2023-05-12 ASSESSMENT — FIBROSIS 4 INDEX: FIB4 SCORE: 0.59

## 2023-05-12 ASSESSMENT — PAIN DESCRIPTION - PAIN TYPE
TYPE: ACUTE PAIN
TYPE: ACUTE PAIN

## 2023-05-12 NOTE — PROCEDURES
Cardiac Catheterization Procedure Note    DATE: 5/12/2023    : Toy Larson MD    PROCEDURES PERFORMED:  Left heart catheterization  Coronary angiography  Intravascular ultrasound of the left anterior descending coronary  Percutaneous coronary intervention to the mid left anterior descending coronary  Moderate conscious sedation    INDICATIONS:  The patient is a 42-year-old gentleman referred for cardiac catheterization to evaluate for typical anginal chest pain with an abnormal cardiac stress test suggestive of left anterior descending coronary artery distribution ischemia.    CONSENT:  The complete alternatives, risks, and benefits of the procedure were explained to the patient. Signed informed consent was obtained and placed in the chart prior to the procedure.  A timeout was performed prior to beginning the procedure.    MEDICATIONS:  Lidocaine  Fentanyl  Midazolam  Nitroglycerin  Verapamil  Heparin  Prasugrel    MODERATE CONSCIOUS SEDATION:  I personally supervised the administration of moderate conscious sedation by the nursing staff for 45 minutes.  Sedation start time: 11:14 AM  Sedation end time: 11:59 AM    CONTRAST: Omnipaque 80 cc    ACCESS: 6-Faroese Glidesheath in the right radial artery.    ESTIMATED BLOOD LOSS: 20 cc    COMPLICATIONS: None    PROCEDURE IN DETAIL:  The patient was brought to the cardiac catheterization laboratory in the fasting state.  The skin over the right wrist was prepped and draped in the usual sterile fashion. Lidocaine infiltration was used to anesthetize the tissue over the right radial artery.  Using the micropuncture technique, a 6-Faroese Glidesheath was inserted in the right radial artery.  A 5-Faroese Travis diagnostic catheter was then advanced over a standard J-wire into the left ventricular cavity where it was gently aspirated, flushed, and then withdrawn across the aortic valve with sequential pressures measured.  This catheter was then used to engage the  "ostium of the left main coronary artery and cineangiograms were obtained in multiple projections for complete evaluation of the left coronary system.  This catheter was then used to engage the ostium of the right coronary artery and and cineangiograms were obtained in multiple projections for complete evaluation of the right coronary system.  Following completion of coronary angiography, we proceeded with intravascular ultrasound for stent planning.    The Travis catheter was exchanged over a J-wire for a 6-[place]ese EBU-3.5 guide catheter.  This catheter was used to re-engage the ostium of the left main coronary artery.  A 0.014\" Prowater wire was then advanced into the distal aspect of the vessel.  An [place] IVUS catheter was then advanced over the guidewire and IVUS was performed.  The distal reference vessel had mild concentric plaque with a true vessel diameter of approximately 3.4 mm.  The proximal reference had mild concentric plaque with a true vessel diameter of approximately 4.4 mm.  The lesion itself consisted of mostly soft plaque with no significant calcification.  Following intravascular ultrasound, we proceeded with percutaneous coronary intervention.    A 2.5 x 15 mm compliant balloon was then advanced over the guidewire and was used to predilate the lesion at a maximum pressure of 12 christian.  Following predilation, a 3.5 x 26 mm [place] drug-eluting stent was advanced over the guidewire and was deployed across the lesion at a maximum pressure of 16 christian.  After deployment, the stent was evaluated by IVUS.  The distal edge of the stent landed just short of the intended landing zone, but still remained within an area of relatively mild eccentric plaque and achieving a minimal luminal area of approximately 10 mm².  The proximal edge of the stent landed within the intended landing zone where there was mild concentric plaque and achieved a minimal luminal area of approximately 12 mm².  There was very mild " underexpansion in the mid aspect of the stent.  The stent was then postdilated with a 4.0 x 20 mm noncompliant balloon at a maximum pressure of 16 christian.  Final cineangiograms were obtained in orthogonal views, which demonstrated excellent stent expansion and apposition with no evidence of wire perforation, thrombus or dissection. At the completion of the case, all wires, catheters, and sheaths were removed. A TR band was placed using the patent hemostasis technique.    HEMODYNAMICS:   Aortic pressure: 91/43 mmHg  Pre A-wave pressure: 7 mmHg  No significant aortic gradient on pullback    CORONARY ANGIOGRAPHY:  The left main coronary artery is a short, patent vessel that bifurcates into the left anterior descending and left circumflex coronary arteries.  The left anterior descending coronary artery is a large, transapical vessel with proximal luminal irregularities, a focal mid 95% stenosis, and distal luminal irregularities.  It supplies a small first and second diagonal branches and a moderate third diagonal branch with no significant disease.  The left circumflex coronary artery is a large, nondominant vessel with distal 30% disease and otherwise luminal irregularities.  It supplies a small first obtuse marginal branch, a moderate second obtuse marginal branch, and a small third obtuse marginal branch with no significant disease.  The right coronary artery is a large, dominant vessel with ostial to proximal 40% disease versus vasospasm, ectatic mid 30% disease, and distal 30% disease.  Distally, it bifurcates into a moderate posterior descending coronary artery and a large posterolateral system with luminal irregularities.    INTRAVASCULAR ULTRASOUND:  See IVUS findings and procedure details above.    PERCUTANEOUS CORONARY INTERVENTION:  Lesion location: Mid left anterior descending coronary  Lesion type: A  Lesion length: 10 mm  Pre-intervention GOGO flow: 3  Post-intervention GOGO flow: 3  Pre-intervention  stenosis: 95%  Post-intervention stenosis: 0%  Stent: 3.5 x 26 mm Jesus drug-eluting stent postdilated with a 4.0 mm noncompliant balloon proximally    IMPRESSION:  Severe obstructive one-vessel coronary artery disease involving the mid left anterior descending coronary.  Successful percutaneous coronary intervention to the mid left anterior descending coronary with one 3.5 x 26 mm Jesus drug-eluting stent.  Normal left heart filling pressures.    RECOMMENDATIONS:  Return to floor with continued care per primary service.  TR band release per protocol.  Dual antiplatelet therapy with aspirin and clopidogrel for at least 3-6 months if tolerated.  Optimal medical management of residual cardiovascular risk factors.    NOTIFICATION:  The patient's family was called and notified of the results of his cardiac catheterization.

## 2023-05-12 NOTE — CONSULTS
Cardiology Initial Consult Note    Reason for Consult:  Asked by Dr Timo Gonzales M.D. to see this patient with angina, abnormal stress test  Patient's PCP: Una Monge P.A.-C.    CC: Chest pain      HPI:    This is a 42-year-old male with no significant past cardiac history who initially presented to [facility] with complaints of exertional chest pain.  He has a known history of asthma and GERD and prior substance abuse but currently in remission.    Patient has been experiencing exertional chest pain over several weeks.  Pain is substernal and worsened with strenuous activities.  Notes having radiation to shoulders and jaw.  While at [facility], patient underwent cardiac stress test with MPI.  Was found to have similar chest pain on the treadmill.  MPI showed a large reversible defect involving apex and anterior wall concerning for LAD territory ischemia.  The patient was subsequently transferred to [facility] for ongoing cardiac care with coronary angiogram after primary team discussed with cardiology at [facility].    Troponins minmally elevated and flat on repeat. EKG shows sinus rhythm and no significant ischemic changes. Echocardiogram shows normal LV systolic function with LVEF 60%, normal RV size and function and no significant valvular abnormalities.      Medications / Drug list prior to admission:  No current facility-administered medications on file prior to encounter.     Current Outpatient Medications on File Prior to Encounter   Medication Sig Dispense Refill    amoxicillin (AMOXIL) 500 MG Cap Take 500 mg by mouth 3 times a day. Dental pain      acetaminophen (TYLENOL) 500 MG Tab Take 1,000 mg by mouth every 6 hours as needed for Moderate Pain. Indications: Pain      ibuprofen (MOTRIN) 200 MG Tab Take 600 mg by mouth every 6 hours as needed for Mild Pain.         Current list of administered Medications:    Current Facility-Administered Medications:      acetaminophen (Tylenol) tablet 650 mg, 650 mg, Oral, Q6HRS PRN, Timo Gonzales M.D.    [START ON 2023] aspirin EC (ECOTRIN) tablet 81 mg, 81 mg, Oral, DAILY, Timo Gonzales M.D.    hydrALAZINE (APRESOLINE) injection 10 mg, 10 mg, Intravenous, Q4HRS PRN, Timo Gonzales M.D.    nitroglycerin (NITROSTAT) tablet 0.4 mg, 0.4 mg, Sublingual, Q5 MIN PRN, Timo Gonzales M.D.    senna-docusate (PERICOLACE or SENOKOT S) 8.6-50 MG per tablet 2 Tablet, 2 Tablet, Oral, BID **AND** polyethylene glycol/lytes (MIRALAX) PACKET 1 Packet, 1 Packet, Oral, QDAY PRN **AND** bisacodyl (DULCOLAX) suppository 10 mg, 10 mg, Rectal, QDAY PRN, Timo Gonzales M.D.    atorvastatin (LIPITOR) tablet 80 mg, 80 mg, Oral, Q EVENING, Timo Gonzales M.D., 80 mg at 23 1659    metoprolol tartrate (LOPRESSOR) tablet 12.5 mg, 12.5 mg, Oral, TWICE DAILY, Timo Gonzales M.D., 12.5 mg at 23 1659    lisinopril (PRINIVIL) tablet 2.5 mg, 2.5 mg, Oral, Q DAY, Timo Gonzales M.D., 2.5 mg at 23 1659    Past Medical History:   Diagnosis Date    ASTHMA     GERD (gastroesophageal reflux disease)     Methamphetamine abuse (HCC) 3/16/2013    Tobacco use        Past Surgical History:   Procedure Laterality Date    OTHER (COMMENTS)      sinus surgery    TONSILLECTOMY AND ADENOIDECTOMY         Family History   Problem Relation Age of Onset    Heart Disease Other         early heart disease in his 30s    Heart Disease Father     Heart Disease Paternal Uncle          of early heart disease in his 30s     Patient family history was personally reviewed, no pertinent family history to current presentation    Social History     Tobacco Use    Smoking status: Some Days     Packs/day: 0.50     Types: Cigarettes    Smokeless tobacco: Never   Vaping Use    Vaping Use: Never used   Substance Use Topics    Alcohol use: Yes    Drug use: Yes     Types: Inhaled     Comment: relapsed on Meth- 3/23/22       ALLERGIES:  Allergies   Allergen Reactions    Benadryl [Altaryl]  "Unspecified     twitching    Dramamine [Dimenhydrinate] Unspecified     twitching    Oxycodone Nausea       Review of systems:  A complete review of symptoms was reviewed with the patient. This is reviewed in H&P and PMH. ALL OTHERS reviewed and negative    Physical exam:  Patient Vitals for the past 24 hrs:   BP Temp Temp src Pulse Resp SpO2 Height Weight   05/11/23 1618 (!) 146/85 36.7 °C (98.1 °F) Temporal 60 14 98 % 1.778 m (5' 10\") 80.3 kg (177 lb 0.5 oz)     General: Not in acute distress  HEENT: OP clear   Neck:  No carotid bruits, No JVD appreciated  CVS:  RRR, Normal S1, S2. No murmurs, rubs or gallops  Resp: Normal respiratory effort, lungs CTA bilaterally. No rales or rhonchi  Abdomen: Soft, non-distended, non-tender to palpation, no guarding or rigidity  Skin: No obvious rashes, no cyanosis  Neurological: Alert and oriented x3, moves all extremities, no focal neurologic deficits  Psychiatric: Appropriate affect  Extremities:   Extremities warm, 2+ bilateral radial pulses.  2+ bilateral dp pulses, no lower extremity edema bilaterally    Data:  Laboratory studies personally reviewed by me:  Recent Results (from the past 24 hour(s))   TROPONIN    Collection Time: 05/10/23  8:00 PM   Result Value Ref Range    Troponin T 23 (H) 6 - 19 ng/L   URINE DRUG SCREEN    Collection Time: 05/10/23  9:45 PM   Result Value Ref Range    Amphetamines Urine Negative Negative    Barbiturates Negative Negative    Benzodiazepines Negative Negative    Cocaine Metabolite Negative Negative    Methadone Negative Negative    Opiates Negative Negative    Oxycodone Negative Negative    Phencyclidine -Pcp Negative Negative    Propoxyphene Negative Negative    Cannabinoid Metab Negative Negative   CBC without Differential    Collection Time: 05/11/23 12:21 AM   Result Value Ref Range    WBC 9.0 4.8 - 10.8 K/uL    RBC 4.26 (L) 4.70 - 6.10 M/uL    Hemoglobin 13.5 (L) 14.0 - 18.0 g/dL    Hematocrit 40.7 (L) 42.0 - 52.0 %    MCV 95.5 81.4 " - 97.8 fL    MCH 31.7 27.0 - 33.0 pg    MCHC 33.2 (L) 33.7 - 35.3 g/dL    RDW 47.8 35.9 - 50.0 fL    Platelet Count 300 164 - 446 K/uL    MPV 9.8 9.0 - 12.9 fL   Comp Metabolic Panel (CMP)    Collection Time: 05/11/23 12:21 AM   Result Value Ref Range    Sodium 138 135 - 145 mmol/L    Potassium 4.2 3.6 - 5.5 mmol/L    Chloride 103 96 - 112 mmol/L    Co2 28 20 - 33 mmol/L    Anion Gap 7.0 7.0 - 16.0    Glucose 92 65 - 99 mg/dL    Bun 17 8 - 22 mg/dL    Creatinine 1.06 0.50 - 1.40 mg/dL    Calcium 9.0 8.4 - 10.2 mg/dL    AST(SGOT) 25 12 - 45 U/L    ALT(SGPT) 37 2 - 50 U/L    Alkaline Phosphatase 68 30 - 99 U/L    Total Bilirubin 0.6 0.1 - 1.5 mg/dL    Albumin 3.9 3.2 - 4.9 g/dL    Total Protein 6.1 6.0 - 8.2 g/dL    Globulin 2.2 1.9 - 3.5 g/dL    A-G Ratio 1.8 g/dL   Magnesium    Collection Time: 05/11/23 12:21 AM   Result Value Ref Range    Magnesium 1.9 1.5 - 2.5 mg/dL   Lipid Profile    Collection Time: 05/11/23 12:21 AM   Result Value Ref Range    Cholesterol,Tot 136 100 - 199 mg/dL    Triglycerides 66 0 - 149 mg/dL    HDL 67 >=40 mg/dL    LDL 56 <100 mg/dL   TSH WITH REFLEX TO FT4    Collection Time: 05/11/23 12:21 AM   Result Value Ref Range    TSH 1.240 0.380 - 5.330 uIU/mL   TROPONIN    Collection Time: 05/11/23 12:21 AM   Result Value Ref Range    Troponin T 20 (H) 6 - 19 ng/L   CORRECTED CALCIUM    Collection Time: 05/11/23 12:21 AM   Result Value Ref Range    Correct Calcium 9.1 8.5 - 10.5 mg/dL   ESTIMATED GFR    Collection Time: 05/11/23 12:21 AM   Result Value Ref Range    GFR (CKD-EPI) 89 >60 mL/min/1.73 m 2   TROPONIN    Collection Time: 05/11/23  4:28 AM   Result Value Ref Range    Troponin T 20 (H) 6 - 19 ng/L   EC-ECHOCARDIOGRAM COMPLETE W/O CONT    Collection Time: 05/11/23  8:33 AM   Result Value Ref Range    Eject.Frac. MOD BP 58.32     Eject.Frac. MOD 4C 56.22     Eject.Frac. MOD 2C 62.83     Left Ventrical Ejection Fraction 60        Imaging:  No orders to display       EKG tracings personally  reviewed by me shows sinus rhythm    Echocardiogram images personally reviewed by me shows normal LV systolic function, LVEF 60%, normal RV size and function, no significant valvular abnormalities.    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 *      Assessment / Plan:  This is a 42-year-old male with no significant past cardiac history admitted  presented to [facility] with exertional chest pain and found to have abnormal stress test with reversible defect involving apex and anterior wall.    Exertional chest pain  Abnormal stress test; high risk perfusion defect involving LAD territory  Hypertension    Plan:  Review of his cardiac stress test and myocardial perfusion imaging shows a large reversible defect involving the apex and anterior wall consistent with LAD territory ischemia.  This is a high risk finding, he will need left heart catheterization with coronary angiography during his hospitalization.  We will plan for procedure tomorrow.  Please keep the patient n.p.o. after midnight.  He is currently chest pain-free and hemodynamically stable.  If he develops recurrent chest pain overnight, recommend starting heparin drip and nitroglycerin.  Increase metoprolol to 25 mg twice daily  Continue lisinopril  Continue high intensity atorvastatin 80mg daily and aspirin 81mg.    The risks, benefits, and alternatives to coronary angiography with IV sedation were discussed in great detail. Specific risks mentioned include bleeding, infection, kidney damage, allergic reaction, cardiac perforation with possible tamponade requiring roro-cardiocentesis or possible open heart surgery. In addition, we discussed that 10% of patients will  experience small to moderate bruising at the side of the arterial puncture. Lastly the risks of heart attack, stroke, and death were discussed; the risks of major complications such as heart attack or stroke caused by the angiogram is less than 1%; the risk of death is approximately 1 in 1000. The patient verbalized understanding of these potential complications and wishes to proceed with this procedure.      I personally discussed his case with  Dr Timo Gonzales M.D.      It is my pleasure to participate in the care of Mr. Correa.  Please do not hesitate to contact me with questions or concerns.    John Tran MD, [facility]  Cardiologist, [facility]    5/11/2023    Please note that this dictation was created using voice recognition software. I have made every reasonable attempt to correct obvious errors, but it is possible there are errors of grammar and possibly content that I did not discover before finalizing the note.

## 2023-05-12 NOTE — PROGRESS NOTES
Assumed care of patient at bedside report from day shift RN. Updated on POC. Patient currently A & O x 4; Call light within reach. Whiteboard updated. Fall precautions in place. Bed locked and in lowest position. All questions answered. No other needs indicated at this time.

## 2023-05-12 NOTE — DIETARY
Nutrition Services: Cardiac Education Consult   Day 1 of admit.  Colt Correa is a 42 y.o. male with admitting DX of Unstable angina    RD able to visit pt at bedside to provide heart healthy diet education with CAD considerations. RD discussed balanced diet, provided handout reinforcing topics discussed and information on foods to include and foods to minimize. Pt demonstrated appropriate readiness and evidence of learning. RD able to answer all questions to patient's satisfaction.     No other education needs identified at this time. Consider referral to outpatient nutrition services for continuation of education as indicated or per pt preferences.     Please re-consult RD as indicated.

## 2023-05-12 NOTE — PROGRESS NOTES
12.5mg metoprolol administered per MAR. Order then discontinued and new order placed for 25mg metoprolol. Per Dr. Gonzales, to give 12.5mg of 25mg ordered dose for a total of 25mg this PM.

## 2023-05-13 ENCOUNTER — PHARMACY VISIT (OUTPATIENT)
Dept: PHARMACY | Facility: MEDICAL CENTER | Age: 43
End: 2023-05-13
Payer: COMMERCIAL

## 2023-05-13 VITALS
HEIGHT: 70 IN | WEIGHT: 175.71 LBS | DIASTOLIC BLOOD PRESSURE: 58 MMHG | OXYGEN SATURATION: 98 % | BODY MASS INDEX: 25.15 KG/M2 | RESPIRATION RATE: 16 BRPM | TEMPERATURE: 98.4 F | HEART RATE: 66 BPM | SYSTOLIC BLOOD PRESSURE: 107 MMHG

## 2023-05-13 LAB
ALBUMIN SERPL BCP-MCNC: 3.8 G/DL (ref 3.2–4.9)
ALBUMIN/GLOB SERPL: 1.6 G/DL
ALP SERPL-CCNC: 76 U/L (ref 30–99)
ALT SERPL-CCNC: 33 U/L (ref 2–50)
ANION GAP SERPL CALC-SCNC: 9 MMOL/L (ref 7–16)
AST SERPL-CCNC: 26 U/L (ref 12–45)
BILIRUB SERPL-MCNC: 0.5 MG/DL (ref 0.1–1.5)
BUN SERPL-MCNC: 21 MG/DL (ref 8–22)
CALCIUM ALBUM COR SERPL-MCNC: 8.9 MG/DL (ref 8.5–10.5)
CALCIUM SERPL-MCNC: 8.7 MG/DL (ref 8.5–10.5)
CHLORIDE SERPL-SCNC: 106 MMOL/L (ref 96–112)
CO2 SERPL-SCNC: 25 MMOL/L (ref 20–33)
CREAT SERPL-MCNC: 0.92 MG/DL (ref 0.5–1.4)
GFR SERPLBLD CREATININE-BSD FMLA CKD-EPI: 105 ML/MIN/1.73 M 2
GLOBULIN SER CALC-MCNC: 2.4 G/DL (ref 1.9–3.5)
GLUCOSE SERPL-MCNC: 87 MG/DL (ref 65–99)
MAGNESIUM SERPL-MCNC: 1.9 MG/DL (ref 1.5–2.5)
POTASSIUM SERPL-SCNC: 4.6 MMOL/L (ref 3.6–5.5)
PROT SERPL-MCNC: 6.2 G/DL (ref 6–8.2)
SODIUM SERPL-SCNC: 140 MMOL/L (ref 135–145)

## 2023-05-13 PROCEDURE — RXMED WILLOW AMBULATORY MEDICATION CHARGE: Performed by: STUDENT IN AN ORGANIZED HEALTH CARE EDUCATION/TRAINING PROGRAM

## 2023-05-13 PROCEDURE — 700102 HCHG RX REV CODE 250 W/ 637 OVERRIDE(OP): Performed by: INTERNAL MEDICINE

## 2023-05-13 PROCEDURE — 80053 COMPREHEN METABOLIC PANEL: CPT

## 2023-05-13 PROCEDURE — 83735 ASSAY OF MAGNESIUM: CPT

## 2023-05-13 PROCEDURE — A9270 NON-COVERED ITEM OR SERVICE: HCPCS | Performed by: INTERNAL MEDICINE

## 2023-05-13 PROCEDURE — 99232 SBSQ HOSP IP/OBS MODERATE 35: CPT | Performed by: INTERNAL MEDICINE

## 2023-05-13 PROCEDURE — A9270 NON-COVERED ITEM OR SERVICE: HCPCS | Performed by: HOSPITALIST

## 2023-05-13 PROCEDURE — 700102 HCHG RX REV CODE 250 W/ 637 OVERRIDE(OP): Performed by: HOSPITALIST

## 2023-05-13 PROCEDURE — 99239 HOSP IP/OBS DSCHRG MGMT >30: CPT | Performed by: STUDENT IN AN ORGANIZED HEALTH CARE EDUCATION/TRAINING PROGRAM

## 2023-05-13 RX ADMIN — CLOPIDOGREL BISULFATE 75 MG: 75 TABLET ORAL at 05:08

## 2023-05-13 RX ADMIN — LISINOPRIL 2.5 MG: 5 TABLET ORAL at 05:09

## 2023-05-13 RX ADMIN — ASPIRIN 81 MG: 81 TABLET, COATED ORAL at 05:09

## 2023-05-13 NOTE — DISCHARGE SUMMARY
Discharge Summary    CHIEF COMPLAINT ON ADMISSION  Chest pain      Reason for Admission  Abnormal stress test, need for further evaluation with left heart catheterization    Admission Date  5/11/2023    CODE STATUS  Full Code    HPI & HOSPITAL COURSE  Colt Correa is a 42 y.o. male who presented 5/11/2023 with chest pain and positive stress test.  This is a pleasant gentleman with a history of homelessness, methamphetamine use, asthma, GERD, who presented initially to [facility] with chest pain.  He also had a history of undergoing a nuclear medicine stress test in 2013.  Unknown whether this was abnormal at that time although he was told that he had an enlarged ventricle in the past that prompted him to stop methamphetamines.    Patient presented with a history of typical exertional substernal chest pain worse with exertion and improved with rest.  Some radiation to the bilateral shoulders in general.  He underwent a treadmill stress test with reproduction of the symptoms with a high risk stress test result.  He was therefore transferred to [facility] for higher level care and left heart catheterization for further evaluation.    Patient underwent left heart catheterization on 5/12/2023, which revealed 95% stenosis of the mid left anterior descending artery.  A Jesus drug-eluting stent was placed with grade 3 GOGO flow.  Patient was started on dual antiplatelet therapy with aspirin and clopidogrel.  Recommendation was for 3 to 6 months of dual antiplatelet therapy if tolerated and then aspirin indefinitely.  Optimal medical management of residual cardiovascular risk factors.    Patient continues to do well overnight following his catheterization.    Therefore, he is discharged in good and stable condition to home with close outpatient follow-up.    The patient met 2-midnight criteria for an inpatient stay at the time of discharge.    Discharge Date  5/13/2023    FOLLOW UP  ITEMS POST DISCHARGE  -Follow-up with PCP in 3 to 5 days.  -Follow-up with [facility] cardiology.    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 [facility]  [address]  [address]  [facility] [id number] [phone]  Call  Your doctor has referred you for Cardiac Rehab, which is important for your recovery. Please call to make an appointment.    Una Monge P.A.-C.  [address] [facility] [address]  [phone]    Follow up in 3 day(s)        MEDICATIONS ON DISCHARGE     Medication List        Start taking these medications        Instructions   aspirin 81 MG EC tablet   Take 1 Tablet by mouth every day.  Dose: 81 mg     atorvastatin 20 MG Tabs  Commonly known as: LIPITOR   Take 1 Tablet by mouth every day.  Dose: 20 mg     clopidogrel 75 MG Tabs  Commonly known as: PLAVIX   Take 1 Tablet by mouth every day.  Dose: 75 mg     lisinopril 2.5 MG Tabs  Commonly known as: PRINIVIL   Take 1 Tablet by mouth every day.  Dose: 2.5 mg     metoprolol tartrate 25 MG Tabs  Commonly known as: LOPRESSOR   Take 0.5 Tablets by mouth 2 times a day.  Dose: 12.5 mg     nitroglycerin 0.4 MG Subl  Commonly known as: NITROSTAT   Place 1 Tablet under the tongue as needed for Chest Pain (up to 3 doses (if SBP greater than 90 mmHg)).  Dose: 0.4 mg            Continue taking these medications        Instructions   acetaminophen 500 MG Tabs  Commonly known as: TYLENOL   Take 1,000 mg by mouth every 6 hours as needed for  Moderate Pain. Indications: Pain  Dose: 1,000 mg     ibuprofen 200 MG Tabs  Commonly known as: MOTRIN   Take 600 mg by mouth every 6 hours as needed for Mild Pain.  Dose: 600 mg            Stop taking these medications      amoxicillin 500 MG Caps  Commonly known as: AMOXIL              Allergies  Allergies   Allergen Reactions    Benadryl [Altaryl] Unspecified     twitching    Dramamine [Dimenhydrinate] Unspecified     twitching    Oxycodone Nausea       DIET  Orders Placed This Encounter   Procedures    Diet Order Diet: Cardiac     Standing Status:   Standing     Number of Occurrences:   1     Order Specific Question:   Diet:     Answer:   Cardiac [6]       ACTIVITY  As tolerated.  Weight bearing as tolerated    CONSULTATIONS  Cardiology    PROCEDURES  DATE: 5/12/2023     : Toy Larson MD     PROCEDURES PERFORMED:  Left heart catheterization  Coronary angiography  Intravascular ultrasound of the left anterior descending coronary  Percutaneous coronary intervention to the mid left anterior descending coronary  Moderate conscious sedation      LABORATORY  Lab Results   Component Value Date    SODIUM 140 05/13/2023    POTASSIUM 4.6 05/13/2023    CHLORIDE 106 05/13/2023    CO2 25 05/13/2023    GLUCOSE 87 05/13/2023    BUN 21 05/13/2023    CREATININE 0.92 05/13/2023    CREATININE 1.1 01/11/2009        Lab Results   Component Value Date    WBC 9.0 05/11/2023    HEMOGLOBIN 13.5 (L) 05/11/2023    HEMATOCRIT 40.7 (L) 05/11/2023    PLATELETCT 300 05/11/2023        Total time of the discharge process exceeds 31 minutes.

## 2023-05-13 NOTE — CARE PLAN
Problem: Knowledge Deficit - Standard  Goal: Patient and family/care givers will demonstrate understanding of plan of care, disease process/condition, diagnostic tests and medications  Outcome: Progressing     Problem: Pain - Standard  Goal: Alleviation of pain or a reduction in pain to the patient’s comfort goal  Outcome: Progressing   The patient is Stable - Low risk of patient condition declining or worsening    Shift Goals  Clinical Goals: reduce anxiety, monitor cath site  Patient Goals: rest/ d/c  Family Goals: n/a    Progress made toward(s) clinical / shift goals:  possible d/c 5/13      Patient is not progressing towards the following goals:

## 2023-05-13 NOTE — DISCHARGE PLANNING
Approved service completed and faxed to [facility] pharmacy for d/c medications, as pt is pending [place] Medicaid and unable to afford cost.

## 2023-05-13 NOTE — PROGRESS NOTES
Pt's tele box removed and placed at nurse's station for discharge. Pt's PIV removed prior to discharge. Discharge instructions gone over, meds to bed provided to patient. Belongings gathered and questions answered. Pt had concern for low HR but pt had no events overnight per monitor tech and pt is informed.  Transport takes pt to the discharge lounge for more discharge instructions/education

## 2023-05-13 NOTE — PROGRESS NOTES
Cardiology Progress Note:    Dina Rice M.D.  Date & Time note created:    5/13/2023   1:59 AM     Referring MD:  Dr. Gandhi    Patient ID:   Name:             Colt Correa   YOB: 1980  Age:                 42 y.o.  male   MRN:               [id number]                                                             Chief Complaint / Reason for consult:  Chest pain    History of Present Illness:    42 years old man with chest pain, positive stress test. S/P mLAD stent.    Overnight events:  No acute events overnight.      Review of Systems:      Constitutional: Denies fevers, Denies weight changes  Eyes: Denies changes in vision, no eye pain  Ears/Nose/Throat/Mouth: Denies nasal congestion or sore throat   Cardiovascular: no chest pain, no palpitations   Respiratory: no shortness of breath , Denies cough  Gastrointestinal/Hepatic: Denies abdominal pain, nausea, vomiting, diarrhea, constipation or GI bleeding   Genitourinary: Denies dysuria or frequency  Musculoskeletal/Rheum: Denies  joint pain and swelling   Skin: Denies rash  Neurological: Denies headache, confusion, memory loss or focal weakness/parasthesias  Psychiatric: denies mood disorder   Endocrine: July thyroid problems  Heme/Oncology/Lymph Nodes: Denies enlarged lymph nodes, denies brusing or known bleeding disorder  All other systems were reviewed and are negative (AMA/CMS criteria)                Past Medical History:   Past Medical History:   Diagnosis Date    ASTHMA     Coronary artery disease involving native coronary artery of native heart with unstable angina pectoris (HCC) 5/12/2023    GERD (gastroesophageal reflux disease)     Methamphetamine abuse (HCC) 3/16/2013    Tobacco use      Active Hospital Problems    Diagnosis     Coronary artery disease involving native coronary artery of native heart with unstable angina pectoris (HCC) [I25.110]     Homelessness [Z59.00]     S/P angioplasty with stent [Z95.820]      Methamphetamine abuse in remission ([facility]) [F15.11]        Past Surgical History:  Past Surgical History:   Procedure Laterality Date    OTHER (COMMENTS)      sinus surgery    TONSILLECTOMY AND ADENOIDECTOMY         Hospital Medications:    Current Facility-Administered Medications:     clopidogrel (PLAVIX) tablet 75 mg, 75 mg, Oral, DAILY, Toy Larson M.D.    enoxaparin (Lovenox) inj 40 mg, 40 mg, Subcutaneous, DAILY AT 1800, Bud Gandhi M.D., 40 mg at 05/12/23 1943    acetaminophen (Tylenol) tablet 650 mg, 650 mg, Oral, Q6HRS PRN, Timo Gonzales M.D.    aspirin EC (ECOTRIN) tablet 81 mg, 81 mg, Oral, DAILY, Timo Gonzales M.D., 81 mg at 05/12/23 0537    hydrALAZINE (APRESOLINE) injection 10 mg, 10 mg, Intravenous, Q4HRS PRN, Timo Gonzales M.D.    nitroglycerin (NITROSTAT) tablet 0.4 mg, 0.4 mg, Sublingual, Q5 MIN PRN, Timo Gonzales M.D., 0.4 mg at 05/11/23 1953    senna-docusate (PERICOLACE or SENOKOT S) 8.6-50 MG per tablet 2 Tablet, 2 Tablet, Oral, BID **AND** polyethylene glycol/lytes (MIRALAX) PACKET 1 Packet, 1 Packet, Oral, QDAY PRN **AND** bisacodyl (DULCOLAX) suppository 10 mg, 10 mg, Rectal, QDAY PRN, Timo Gonzales M.D.    atorvastatin (LIPITOR) tablet 80 mg, 80 mg, Oral, Q EVENING, Timo Gonzales M.D., 80 mg at 05/12/23 1720    lisinopril (PRINIVIL) tablet 2.5 mg, 2.5 mg, Oral, Q DAY, Timo Gonzales M.D., 2.5 mg at 05/12/23 0537    metoprolol tartrate (LOPRESSOR) tablet 25 mg, 25 mg, Oral, TWICE DAILY, John VARGAS M.D., 25 mg at 05/12/23 1720    Current Outpatient Medications:  Medications Prior to Admission   Medication Sig Dispense Refill Last Dose    acetaminophen (TYLENOL) 500 MG Tab Take 1,000 mg by mouth every 6 hours as needed for Moderate Pain. Indications: Pain   5/8/2023    ibuprofen (MOTRIN) 200 MG Tab Take 600 mg by mouth every 6 hours as needed for Mild Pain.   4/24/2023    [DISCONTINUED] amoxicillin (AMOXIL) 500 MG Cap Take 500 mg by mouth 3 times a day. Dental pain   4/24/2023  "      Medication Allergy:  Allergies   Allergen Reactions    Benadryl [Altaryl] Unspecified     twitching    Dramamine [Dimenhydrinate] Unspecified     twitching    Oxycodone Nausea       Family History:  Family History   Problem Relation Age of Onset    Heart Disease Other         early heart disease in his 30s    Heart Disease Father     Heart Disease Paternal Uncle          of early heart disease in his 30s       Social History:  Social History     Socioeconomic History    Marital status: Single     Spouse name: Not on file    Number of children: Not on file    Years of education: Not on file    Highest education level: Not on file   Occupational History    Not on file   Tobacco Use    Smoking status: Some Days     Packs/day: 0.50     Types: Cigarettes    Smokeless tobacco: Never   Vaping Use    Vaping Use: Never used   Substance and Sexual Activity    Alcohol use: Yes    Drug use: Yes     Types: Inhaled     Comment: relapsed on Meth- 3/23/22    Sexual activity: Yes     Partners: Female   Other Topics Concern    Not on file   Social History Narrative    Not on file     Social Determinants of Health     Financial Resource Strain: Not on file   Food Insecurity: Not on file   Transportation Needs: Not on file   Physical Activity: Not on file   Stress: Not on file   Social Connections: Not on file   Intimate Partner Violence: Not on file   Housing Stability: Not on file         Physical Exam:  Vitals/ General Appearance:   Weight/BMI: Body mass index is 25.21 kg/m².  /76   Pulse 64   Temp 36.7 °C (98.1 °F) (Temporal)   Resp 16   Ht 1.778 m (5' 10\")   Wt 79.7 kg (175 lb 11.3 oz)   SpO2 98%   Vitals:    23 1600 23 1700 23 1900 23 0122   BP: 121/73 137/77 120/74 120/76   Pulse: 68 64 63 64   Resp:  17 16 16   Temp:   36.4 °C (97.5 °F) 36.7 °C (98.1 °F)   TempSrc:  Temporal Temporal Temporal   SpO2: 90% 98% 97% 98%   Weight:       Height:         Oxygen Therapy:  Pulse Oximetry: 98 " %, O2 (LPM): 0, O2 Delivery Device: None - Room Air    Constitutional:   Well developed, Well nourished, No acute distress  HENMT:  Normocephalic, Atraumatic, Oropharynx moist mucous membranes, No oral exudates, Nose normal.  No thyromegaly.  Eyes:  EOMI, Conjunctiva normal, No discharge.  Neck:  Normal range of motion, No cervical tenderness,  no JVD.  Cardiovascular:  Normal heart rate, Normal rhythm, No murmurs, No rubs, No gallops.   Extremitites with intact distal pulses, no cyanosis, or edema.  Lungs:  Normal breath sounds, breath sounds clear to auscultation bilaterally,  no rales, no rhonchi, no wheezing.   Abdomen: Bowel sounds normal, Soft, No tenderness, No guarding, No rebound, No masses, No hepatosplenomegaly.  Skin: Warm, Dry, No erythema, No rash, no induration.  Neurologic: Alert & oriented x 3, No focal deficits noted, cranial nerves II through X are intact.  Psychiatric: Affect normal, Judgment normal, Mood normal.      MDM (Data Review):     Records reviewed and summarized in current documentation    Lab Data Review:  Recent Results (from the past 24 hour(s))   Lipid Profile (Tomorrow AM)    Collection Time: 23  2:08 AM   Result Value Ref Range    Cholesterol,Tot 134 100 - 199 mg/dL    Triglycerides 84 0 - 149 mg/dL    HDL 62 >=40 mg/dL    LDL 55 <100 mg/dL   POCT activated clotting time device results    Collection Time: 23 11:42 AM   Result Value Ref Range    Istat Activated Clotting Time 215 (H) 74 - 137 sec   POCT activated clotting time device results    Collection Time: 23 12:02 PM   Result Value Ref Range    Istat Activated Clotting Time 227 (H) 74 - 137 sec   EKG STAT    Collection Time: 23 12:38 PM   Result Value Ref Range    Report       [facility]    Test Date:  2023  Pt Name:    DEBRA VORA               Department: 183  MRN:        [id number]                      Room:       T818  Gender:     Male                         Technician: COLEMAN  :         1980                   Requested By:STEPH DUDLEY GEORGE  Order #:    [id number]                    Reading MD: Yonatan Myers MD    Measurements  Intervals                                Axis  Rate:       54                           P:          59  IA:         155                          QRS:        57  QRSD:       96                           T:          69  QT:         427  QTc:        405    Interpretive Statements  Sinus bradycardia  Early precordial R/S transition  Anterolateral T wave abnormality, nonspecific  Electronically Signed On 5- 16:11:46 PDT by Yonatan Myers MD         Imaging/Procedures Review:    Chest Xray:  Reviewed    EKG:   As in HPI.     MDM (Assessment and Plan):     Active Hospital Problems    Diagnosis     Coronary artery disease involving native coronary artery of native heart with unstable angina pectoris ([facility]) [I25.110]     Homelessness [Z59.00]     S/P angioplasty with stent [Z95.820]     Methamphetamine abuse in remission ([facility]) [F15.11]      I have independently interpreted and reviewed echocardiogram's actual images with patient which showed normal left ventricular systolic function. No wall motion abnormality. No evidence of pulmonary hypertension. No significant valvular disease.      Coronary arterial disease s/p mLAD stent in 05/2023:  Betablocker as Metoprolol 25 mg po 2x daily.  ASA 81 mg po daily and Clopidogrel 75mg 1x daily  Statin as Atorvastatin 80 mg po daily  ACE-I as Lisinopril 2.5 mg po daily.    Ok to go home.     Hypertension:  Optimize control with BB, ACE-I or ARB as permitted above in conjunction with CAD treatment.     Hyperlipidemia:  Optimize statin as within guidelines of CAD treatment as above.    Will sign off at this time, please call us with further questions.  Patient will be followed in the outpatient cardiology clinic for further cardiac care.    Thank you for referring this patient to our cardiology service.    Dina Rice MD.    [facility].

## 2023-05-13 NOTE — DISCHARGE INSTRUCTIONS
Diagnosis:  Acute Coronary Syndrome (ACS) is a diagnosis that encompasses cardiac-related chest pain and heart attack. ACS occurs when the blood flow to the heart muscle is severely reduced or cut off completely due to a slow process called atherosclerosis.  Atherosclerosis is a disease in which the coronary arteries become narrow from a buildup of fat, cholesterol, and other substances that combine to form plaque. If the plaque breaks, a blood clot will form and block the blood flow to the heart muscle. This lack of blood flow can cause damage or death to the heart muscle which is called a heart attack or Myocardial Infarction (MI). There are two different types of MIs:  ST Elevation Myocardial Infarction or STEMI (the most severe type of heart attack) and Non-ST Elevation Myocardial Infarction or NSTEMI.    Treatment Plan:  Cardiac Diet  - Low fat, low salt, low cholesterol   Cardiac Rehab  - Your doctor has ordered you a referral to [facility].  Call [phone] to schedule an appointment.  Attend my follow-up appointment with my Cardiologist.  Take my medications as prescribed by my doctor  Exercise daily  Quit Smoking, Lower my bad cholesterol and raise my good cholesterol, lower my blood pressure, and Reduce stress    Medications:  Certain medications are used to treat ACS.  Remember to always take medications as prescribed and never stop talking medications unless told by your doctor.    You have been prescribed the following medicatons:    Aspirin - Aspirin is used as a blood thinning medication and you will require this medication indefinitely.  Anti-platelet/blood thinner - Your Anti-platelet/Blood thinning medication is called clopidogrel, and is used in combination with aspirin to prevent clots from forming in your heart and/or around your stent.  Your doctor will determine how long you need to be on this medicine.  Beta-Blocker - Beta-Blocker metoprolol SR is used to lower blood pressure and heart rate,  and/or helps your heart heal after a heart attack.  Statin - Statin atorvastatin is used to lower cholesterol.  Nitroglycerine - Nitroglycerine is used to relieve chest pain.

## 2023-05-13 NOTE — DISCHARGE PLANNING
Case Management Discharge Planning    Admission Date: 5/11/2023  GMLOS: 2  ALOS: 2    6-Clicks ADL Score: 24  6-Clicks Mobility Score: 24      Anticipated Discharge Dispo: Discharge Disposition: Discharged to home/self care (01)    DME Needed: No    Action(s) Taken: OTHER    Escalations Completed: None    Medically Clear: Yes    Next Steps: RN MIRIAM has bus ticket for patient to destination of his choice in Care Coordination office.    Barriers to Discharge: None    Melisa PATEL RN Case Manager  T-6  [phone]

## 2023-05-13 NOTE — PROGRESS NOTES
[facility] Medicine Daily Progress Note    Date of Service  5/12/2023    Chief Complaint  Colt Correa is a 42 y.o. male admitted 5/11/2023 with chest pain and positive stress test.    Hospital Course  No notes on file    Interval Problem Update  Patient seen at bedside following his catheterization.  Had 95% stenosis of the mid LAD.  Jesus stent drug-eluting stent placed.  Denies any chest pain.  If stable overnight, he can be discharged tomorrow.    I have discussed this patient's plan of care and discharge plan at IDT rounds today with Case Management, Nursing, Nursing leadership, and other members of the IDT team.    Consultants/Specialty  cardiology    Code Status  Full Code    Disposition  The patient is not medically cleared for discharge to home or a post-acute facility.  Anticipate discharge to: home with close outpatient follow-up    I have placed the appropriate orders for post-discharge needs.    Review of Systems  Review of Systems   Constitutional:  Negative for chills and fever.   Respiratory:  Negative for cough and shortness of breath.    Cardiovascular:  Positive for chest pain. Negative for palpitations.   Gastrointestinal:  Negative for nausea and vomiting.   Neurological:  Negative for dizziness and headaches.        Physical Exam  Temp:  [36.7 °C (98.1 °F)] 36.7 °C (98.1 °F)  Pulse:  [52-68] 64  Resp:  [16-18] 17  BP: (102-137)/(67-83) 137/77  SpO2:  [90 %-98 %] 98 %    Physical Exam  Vitals and nursing note reviewed.   Constitutional:       General: He is not in acute distress.     Appearance: Normal appearance. He is not ill-appearing.   HENT:      Head: Normocephalic and atraumatic.      Mouth/Throat:      Mouth: Mucous membranes are moist.      Pharynx: Oropharynx is clear. No oropharyngeal exudate.   Eyes:      General: No scleral icterus.        Right eye: No discharge.         Left eye: No discharge.      Conjunctiva/sclera: Conjunctivae normal.   Cardiovascular:      Rate and  Rhythm: Normal rate and regular rhythm.      Pulses: Normal pulses.      Heart sounds: Normal heart sounds. No murmur heard.  Pulmonary:      Effort: Pulmonary effort is normal. No respiratory distress.      Breath sounds: Normal breath sounds.   Abdominal:      General: Abdomen is flat. Bowel sounds are normal. There is no distension.      Palpations: Abdomen is soft.   Musculoskeletal:         General: No swelling.      Cervical back: Neck supple. No tenderness.      Right lower leg: No edema.      Left lower leg: No edema.   Skin:     General: Skin is warm and dry.      Coloration: Skin is not pale.   Neurological:      Mental Status: He is alert and oriented to person, place, and time. Mental status is at baseline.      Motor: No weakness.   Psychiatric:         Thought Content: Thought content normal.         Judgment: Judgment normal.         Fluids    Intake/Output Summary (Last 24 hours) at 5/12/2023 1811  Last data filed at 5/12/2023 1202  Gross per 24 hour   Intake 1000 ml   Output no documentation   Net 1000 ml       Laboratory  Recent Labs     05/10/23  0911 05/11/23  0021   WBC 9.5 9.0   RBC 4.34* 4.26*   HEMOGLOBIN 13.8* 13.5*   HEMATOCRIT 40.7* 40.7*   MCV 93.8 95.5   MCH 31.8 31.7   MCHC 33.9 33.2*   RDW 46.0 47.8   PLATELETCT 296 300   MPV 9.6 9.8     Recent Labs     05/10/23  0911 05/11/23  0021   SODIUM 139 138   POTASSIUM 4.1 4.2   CHLORIDE 105 103   CO2 26 28   GLUCOSE 94 92   BUN 10 17   CREATININE 0.76 1.06   CALCIUM 9.2 9.0             Recent Labs     05/11/23  0021 05/12/23  0208   TRIGLYCERIDE 66 84   HDL 67 62   LDL 56 55       Imaging  CL-LEFT HEART CATHETERIZATION WITH POSSIBLE INTERVENTION    (Results Pending)        Assessment/Plan  * Unstable angina (HCC)- (present on admission)  Assessment & Plan  Positive stress testing.  Left heart catheterization showing 95% Stenosis of the LAD.  Drug-eluting stent placed.    Continue aspirin and Plavix  Continue high intensity  atorvastatin  Continue metoprolol 25 mg twice a day  Continue lisinopril 2.5 mg daily    S/P angioplasty with stent  Assessment & Plan  As per coronary angiogram.  Continue dual antiplatelet therapy.    Coronary artery disease involving native coronary artery of native heart with unstable angina pectoris (HCC)- (present on admission)  Assessment & Plan  Status post stenting.  Continue dual antiplatelet therapy    Abnormal EKG- (present on admission)  Assessment & Plan  Check EKG if patient has recurrence of chest pain    Methamphetamine abuse in remission (HCC)- (present on admission)  Assessment & Plan  Patient reports abstinence  Admission urine toxicology screen is negative    Homelessness- (present on admission)  Assessment & Plan  Patient was discharged back to [facility].         VTE prophylaxis: enoxaparin ppx    I have performed a physical exam and reviewed and updated ROS and Plan today (5/12/2023). In review of yesterday's note (5/11/2023), there are no changes except as documented above.

## 2023-05-13 NOTE — PROGRESS NOTES
PT D/C'd. PIV was already removed. Discharge instructions provided to pt.  Pt states understanding.  Pt states all questions have been answered.  Copy of discharge provided to pt.  Signed copy in chart.  Prescriptions provided to pt via M2B. Pt states that all personal belongings are in possession. Pt walked off unit without assistance without incident. Pt calling for ride home.

## 2023-05-23 ENCOUNTER — OFFICE VISIT (OUTPATIENT)
Dept: MEDICAL GROUP | Facility: MEDICAL CENTER | Age: 43
End: 2023-05-23
Payer: COMMERCIAL

## 2023-05-23 VITALS
RESPIRATION RATE: 14 BRPM | HEIGHT: 70 IN | OXYGEN SATURATION: 97 % | BODY MASS INDEX: 26.63 KG/M2 | WEIGHT: 186 LBS | TEMPERATURE: 97.1 F | HEART RATE: 68 BPM | DIASTOLIC BLOOD PRESSURE: 78 MMHG | SYSTOLIC BLOOD PRESSURE: 128 MMHG

## 2023-05-23 DIAGNOSIS — D22.9 MULTIPLE ATYPICAL SKIN MOLES: ICD-10-CM

## 2023-05-23 DIAGNOSIS — M79.672 LEFT FOOT PAIN: ICD-10-CM

## 2023-05-23 DIAGNOSIS — I25.110 CORONARY ARTERY DISEASE INVOLVING NATIVE CORONARY ARTERY OF NATIVE HEART WITH UNSTABLE ANGINA PECTORIS (HCC): ICD-10-CM

## 2023-05-23 DIAGNOSIS — R63.5 WEIGHT GAIN: ICD-10-CM

## 2023-05-23 DIAGNOSIS — N52.9 ERECTILE DYSFUNCTION, UNSPECIFIED ERECTILE DYSFUNCTION TYPE: ICD-10-CM

## 2023-05-23 PROCEDURE — 3078F DIAST BP <80 MM HG: CPT | Performed by: STUDENT IN AN ORGANIZED HEALTH CARE EDUCATION/TRAINING PROGRAM

## 2023-05-23 PROCEDURE — 3074F SYST BP LT 130 MM HG: CPT | Performed by: STUDENT IN AN ORGANIZED HEALTH CARE EDUCATION/TRAINING PROGRAM

## 2023-05-23 PROCEDURE — 99214 OFFICE O/P EST MOD 30 MIN: CPT | Performed by: STUDENT IN AN ORGANIZED HEALTH CARE EDUCATION/TRAINING PROGRAM

## 2023-05-23 ASSESSMENT — ENCOUNTER SYMPTOMS
CHILLS: 0
NAUSEA: 0
ABDOMINAL PAIN: 0
SHORTNESS OF BREATH: 1
VOMITING: 0
FOCAL WEAKNESS: 0
COUGH: 0
FEVER: 0

## 2023-05-23 ASSESSMENT — FIBROSIS 4 INDEX: FIB4 SCORE: 0.65

## 2023-05-23 NOTE — LETTER
May 23, 2023    To Whom It May Concern:         This is confirmation that Colt Fengsamina attended his scheduled appointment with Susanna Lopez M.D. on 5/23/23. Please consider him to do light duty work for at least 3 months.         If you have any questions please do not hesitate to call me at the phone number listed below.    Sincerely,          Susanna Lopez M.D.  [phone]

## 2023-05-23 NOTE — PROGRESS NOTES
"Subjective:     CC: hospital follow up    HPI:   Colt presents today with    Problem   Left Foot Pain    Patient reports left foot pain for 3 weeks, for last 2 days pain is getting worse.       Erectile Dysfunction   Multiple Atypical Skin Moles    Patient has multiple atypical mole on torso, back         Patient was recently hospitalized 5/11-5/13/23 for chest pain. He underwent cardiac cath and LAD stent was placed for 95% stenosis. Patient still reports chest pain on strenuous exertion. He rode bicycle today uphill to this visit and had some chest pain.    ROS:  Review of Systems   Constitutional:  Negative for chills and fever.   Respiratory:  Positive for shortness of breath. Negative for cough.    Cardiovascular:  Positive for chest pain. Negative for leg swelling.   Gastrointestinal:  Negative for abdominal pain, nausea and vomiting.   Neurological:  Negative for focal weakness.       Objective:     Exam:  /78 (BP Location: Left arm, Patient Position: Sitting, BP Cuff Size: Adult)   Pulse 68   Temp 36.2 °C (97.1 °F) (Temporal)   Resp 14   Ht 1.778 m (5' 10\")   Wt 84.4 kg (186 lb)   SpO2 97%   BMI 26.69 kg/m²  Body mass index is 26.69 kg/m².    Physical Exam  Vitals reviewed.   HENT:      Head: Normocephalic.      Mouth/Throat:      Mouth: Mucous membranes are moist.      Pharynx: Oropharynx is clear.   Eyes:      Pupils: Pupils are equal, round, and reactive to light.   Cardiovascular:      Rate and Rhythm: Normal rate and regular rhythm.   Pulmonary:      Effort: Pulmonary effort is normal. No respiratory distress.      Breath sounds: No wheezing or rales.   Abdominal:      General: Abdomen is flat. Bowel sounds are normal. There is no distension.      Tenderness: There is no abdominal tenderness. There is no guarding.   Musculoskeletal:         General: Swelling and tenderness present.      Comments: There is swelling, tenderness on dorsal aspect of left foot.   Skin:     General: Skin is " warm.      Comments: Has multiple atypical moles on torso, back   Neurological:      General: No focal deficit present.      Mental Status: He is alert and oriented to person, place, and time.         Assessment & Plan:     42 y.o. male with the following -     1. Coronary artery disease involving native coronary artery of native heart with unstable angina pectoris (HCC)  New problem. S/p stenting recently.  - aspirin 81 mg  - clopidogrel 75 mg daily  - metoprolol 12.5 mg bid  - lisinopril 2.5 mg  - nitroglycerin 0.4 mg prn  - atorvastatin 20 mg  - letter to work given, requesting light duty work. Patient works in construction.  - follow up with cardiology    2. Left foot pain  New issue. Looks like tendinitis  - DX-FOOT-COMPLETE 3+ LEFT; Future  - diclofenac sodium (VOLTAREN) 1 % Gel; Apply 2 g topically 3 times a day as needed (foot pain).  Dispense: 100 g; Refill: 0  - OTC tylenol, ibuprofen prn    3. Weight gain  Patient reports weight gain around 17 lbs over 3 weeks, despite exercise.  Recent TSH was normal.  We will check testosterone    4. Erectile dysfunction, unspecified erectile dysfunction type  Chronic. Patient reports low libido, difficult arousal, weak erection.  - TESTOSTERONE SERUM; Future  - Referral to Urology    5. Multiple atypical skin moles  Chronic.  - Referral to Dermatology    Return for follow up PCP prn.    Please note that this dictation was created using voice recognition software. I have made every reasonable attempt to correct obvious errors, but I expect that there are errors of grammar and possibly content that I did not discover before finalizing the note.

## 2023-05-26 ENCOUNTER — TELEPHONE (OUTPATIENT)
Dept: CARDIOLOGY | Facility: MEDICAL CENTER | Age: 43
End: 2023-05-26
Payer: COMMERCIAL

## 2023-05-26 NOTE — TELEPHONE ENCOUNTER
Spoke to patient in regards to records for NP appointment with . Patient cannot remember if he has been  treated by a cardiologist, all recent records were takenm at [facility] including blood work, cardiac testing, and EKG. Confirmed appt date, time and location.

## 2023-05-31 ENCOUNTER — TELEPHONE (OUTPATIENT)
Dept: CARDIOLOGY | Facility: MEDICAL CENTER | Age: 43
End: 2023-05-31
Payer: COMMERCIAL

## 2023-05-31 NOTE — TELEPHONE ENCOUNTER
All cardiac records were requested from [facility]. Fax confirmation has been received and sent to scan through [employer].

## 2023-06-07 ENCOUNTER — TELEPHONE (OUTPATIENT)
Dept: CARDIOLOGY | Facility: MEDICAL CENTER | Age: 43
End: 2023-06-07
Payer: COMMERCIAL

## 2023-06-14 ENCOUNTER — PHARMACY VISIT (OUTPATIENT)
Dept: PHARMACY | Facility: MEDICAL CENTER | Age: 43
End: 2023-06-14
Payer: MEDICARE

## 2023-06-14 PROCEDURE — RXMED WILLOW AMBULATORY MEDICATION CHARGE: Performed by: STUDENT IN AN ORGANIZED HEALTH CARE EDUCATION/TRAINING PROGRAM

## 2023-07-17 ENCOUNTER — PHARMACY VISIT (OUTPATIENT)
Dept: PHARMACY | Facility: MEDICAL CENTER | Age: 43
End: 2023-07-17
Payer: MEDICARE

## 2023-07-17 PROCEDURE — RXMED WILLOW AMBULATORY MEDICATION CHARGE: Performed by: STUDENT IN AN ORGANIZED HEALTH CARE EDUCATION/TRAINING PROGRAM

## 2023-08-15 ENCOUNTER — PHARMACY VISIT (OUTPATIENT)
Dept: PHARMACY | Facility: MEDICAL CENTER | Age: 43
End: 2023-08-15
Payer: MEDICARE

## 2023-08-15 PROCEDURE — RXMED WILLOW AMBULATORY MEDICATION CHARGE: Performed by: STUDENT IN AN ORGANIZED HEALTH CARE EDUCATION/TRAINING PROGRAM

## 2023-08-18 ENCOUNTER — TELEPHONE (OUTPATIENT)
Dept: MEDICAL GROUP | Facility: MEDICAL CENTER | Age: 43
End: 2023-08-18
Payer: COMMERCIAL

## 2023-08-18 DIAGNOSIS — Z95.820 S/P ANGIOPLASTY WITH STENT: ICD-10-CM

## 2023-08-18 DIAGNOSIS — I25.110 CORONARY ARTERY DISEASE INVOLVING NATIVE CORONARY ARTERY OF NATIVE HEART WITH UNSTABLE ANGINA PECTORIS (HCC): ICD-10-CM

## 2023-08-18 RX ORDER — CLOPIDOGREL BISULFATE 75 MG/1
75 TABLET ORAL DAILY
Qty: 90 TABLET | Refills: 0 | Status: SHIPPED | OUTPATIENT
Start: 2023-08-18

## 2023-08-18 RX ORDER — LISINOPRIL 2.5 MG/1
2.5 TABLET ORAL DAILY
Qty: 90 TABLET | Refills: 0 | Status: SHIPPED | OUTPATIENT
Start: 2023-08-18

## 2023-08-18 RX ORDER — ASPIRIN 81 MG/1
81 TABLET ORAL DAILY
Qty: 90 TABLET | Refills: 0 | Status: SHIPPED | OUTPATIENT
Start: 2023-08-18

## 2023-08-18 RX ORDER — ATORVASTATIN CALCIUM 20 MG/1
20 TABLET, FILM COATED ORAL DAILY
Qty: 90 TABLET | Refills: 0 | Status: SHIPPED | OUTPATIENT
Start: 2023-08-18

## 2023-08-18 NOTE — TELEPHONE ENCOUNTER
I am sending medication refill for 3 months as patient is completely out fo medications. He has a recent stent placement and need to be on DAPT for at least  3- 6 months.     I have asked staff to coordinate with [facility] for establishing care as unfortunately his insurance hs changed and we no longer can see him.   Patient to get established with a new PCP.   Also recommended to get established with cardiology

## 2023-08-18 NOTE — TELEPHONE ENCOUNTER
Patient came in to request refills for Lisinopril/ Atorvastatin/Aspirin 81mg/Clopidogrel 75mg.. Thank you.

## 2023-08-20 ENCOUNTER — PHARMACY VISIT (OUTPATIENT)
Dept: PHARMACY | Facility: MEDICAL CENTER | Age: 43
End: 2023-08-20
Payer: MEDICARE

## 2023-08-20 PROCEDURE — RXMED WILLOW AMBULATORY MEDICATION CHARGE: Performed by: STUDENT IN AN ORGANIZED HEALTH CARE EDUCATION/TRAINING PROGRAM

## 2023-08-29 ENCOUNTER — OFFICE VISIT (OUTPATIENT)
Dept: INTERNAL MEDICINE | Facility: OTHER | Age: 43
End: 2023-08-29
Payer: COMMERCIAL

## 2023-08-29 VITALS
BODY MASS INDEX: 26.31 KG/M2 | HEIGHT: 70 IN | HEART RATE: 76 BPM | OXYGEN SATURATION: 97 % | WEIGHT: 183.8 LBS | DIASTOLIC BLOOD PRESSURE: 84 MMHG | SYSTOLIC BLOOD PRESSURE: 121 MMHG | TEMPERATURE: 98.4 F

## 2023-08-29 DIAGNOSIS — I25.110 CORONARY ARTERY DISEASE INVOLVING NATIVE CORONARY ARTERY OF NATIVE HEART WITH UNSTABLE ANGINA PECTORIS (HCC): ICD-10-CM

## 2023-08-29 DIAGNOSIS — F15.11 METHAMPHETAMINE ABUSE IN REMISSION (HCC): ICD-10-CM

## 2023-08-29 DIAGNOSIS — M79.641 PAIN IN BOTH HANDS: ICD-10-CM

## 2023-08-29 DIAGNOSIS — Z72.0 TOBACCO USE: Chronic | ICD-10-CM

## 2023-08-29 DIAGNOSIS — M79.672 LEFT FOOT PAIN: ICD-10-CM

## 2023-08-29 DIAGNOSIS — N52.9 ERECTILE DYSFUNCTION, UNSPECIFIED ERECTILE DYSFUNCTION TYPE: ICD-10-CM

## 2023-08-29 DIAGNOSIS — M79.642 PAIN IN BOTH HANDS: ICD-10-CM

## 2023-08-29 PROCEDURE — 99214 OFFICE O/P EST MOD 30 MIN: CPT | Mod: GC

## 2023-08-29 PROCEDURE — 3079F DIAST BP 80-89 MM HG: CPT | Mod: GC

## 2023-08-29 PROCEDURE — 3074F SYST BP LT 130 MM HG: CPT | Mod: GC

## 2023-08-29 RX ORDER — SILDENAFIL 50 MG/1
50 TABLET, FILM COATED ORAL
Qty: 10 TABLET | Refills: 0 | Status: SHIPPED | OUTPATIENT
Start: 2023-08-29 | End: 2023-09-08

## 2023-08-29 RX ORDER — VARENICLINE TARTRATE 0.5 MG/1
0.5 TABLET, FILM COATED ORAL DAILY
Qty: 30 TABLET | Refills: 2 | Status: SHIPPED | OUTPATIENT
Start: 2023-08-29 | End: 2023-11-27

## 2023-08-29 ASSESSMENT — FIBROSIS 4 INDEX: FIB4 SCORE: 0.65
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
## 2023-10-15 ENCOUNTER — PHARMACY VISIT (OUTPATIENT)
Dept: PHARMACY | Facility: MEDICAL CENTER | Age: 43
End: 2023-10-15
Payer: MEDICARE

## 2023-10-15 DIAGNOSIS — I25.110 CORONARY ARTERY DISEASE INVOLVING NATIVE CORONARY ARTERY OF NATIVE HEART WITH UNSTABLE ANGINA PECTORIS (HCC): ICD-10-CM

## 2023-10-15 PROCEDURE — RXMED WILLOW AMBULATORY MEDICATION CHARGE: Performed by: STUDENT IN AN ORGANIZED HEALTH CARE EDUCATION/TRAINING PROGRAM

## 2023-12-04 ENCOUNTER — PHARMACY VISIT (OUTPATIENT)
Dept: PHARMACY | Facility: MEDICAL CENTER | Age: 43
End: 2023-12-04
Payer: MEDICARE

## 2023-12-04 PROCEDURE — RXMED WILLOW AMBULATORY MEDICATION CHARGE: Performed by: STUDENT IN AN ORGANIZED HEALTH CARE EDUCATION/TRAINING PROGRAM

## 2024-03-23 ENCOUNTER — APPOINTMENT (OUTPATIENT)
Dept: RADIOLOGY | Facility: MEDICAL CENTER | Age: 44
End: 2024-03-23
Attending: STUDENT IN AN ORGANIZED HEALTH CARE EDUCATION/TRAINING PROGRAM
Payer: COMMERCIAL

## 2024-03-23 ENCOUNTER — HOSPITAL ENCOUNTER (EMERGENCY)
Facility: MEDICAL CENTER | Age: 44
End: 2024-03-23
Attending: STUDENT IN AN ORGANIZED HEALTH CARE EDUCATION/TRAINING PROGRAM
Payer: COMMERCIAL

## 2024-03-23 VITALS
HEIGHT: 71 IN | TEMPERATURE: 98.2 F | RESPIRATION RATE: 12 BRPM | DIASTOLIC BLOOD PRESSURE: 70 MMHG | WEIGHT: 184.08 LBS | BODY MASS INDEX: 25.77 KG/M2 | SYSTOLIC BLOOD PRESSURE: 129 MMHG | OXYGEN SATURATION: 96 % | HEART RATE: 51 BPM

## 2024-03-23 DIAGNOSIS — D64.9 NORMOCYTIC ANEMIA: ICD-10-CM

## 2024-03-23 DIAGNOSIS — R00.1 SINUS BRADYCARDIA: ICD-10-CM

## 2024-03-23 DIAGNOSIS — R42 DIZZINESS: ICD-10-CM

## 2024-03-23 LAB
ALBUMIN SERPL BCP-MCNC: 3.8 G/DL (ref 3.2–4.9)
ALBUMIN/GLOB SERPL: 1.7 G/DL
ALP SERPL-CCNC: 64 U/L (ref 30–99)
ALT SERPL-CCNC: 19 U/L (ref 2–50)
ANION GAP SERPL CALC-SCNC: 11 MMOL/L (ref 7–16)
AST SERPL-CCNC: 16 U/L (ref 12–45)
BASOPHILS # BLD AUTO: 0.8 % (ref 0–1.8)
BASOPHILS # BLD: 0.07 K/UL (ref 0–0.12)
BILIRUB SERPL-MCNC: 0.2 MG/DL (ref 0.1–1.5)
BUN SERPL-MCNC: 15 MG/DL (ref 8–22)
CALCIUM ALBUM COR SERPL-MCNC: 8.4 MG/DL (ref 8.5–10.5)
CALCIUM SERPL-MCNC: 8.2 MG/DL (ref 8.5–10.5)
CHLORIDE SERPL-SCNC: 109 MMOL/L (ref 96–112)
CO2 SERPL-SCNC: 22 MMOL/L (ref 20–33)
CREAT SERPL-MCNC: 0.97 MG/DL (ref 0.5–1.4)
EKG IMPRESSION: NORMAL
EOSINOPHIL # BLD AUTO: 0.56 K/UL (ref 0–0.51)
EOSINOPHIL NFR BLD: 6.5 % (ref 0–6.9)
ERYTHROCYTE [DISTWIDTH] IN BLOOD BY AUTOMATED COUNT: 39.5 FL (ref 35.9–50)
GFR SERPLBLD CREATININE-BSD FMLA CKD-EPI: 98 ML/MIN/1.73 M 2
GLOBULIN SER CALC-MCNC: 2.2 G/DL (ref 1.9–3.5)
GLUCOSE SERPL-MCNC: 85 MG/DL (ref 65–99)
HCT VFR BLD AUTO: 34.7 % (ref 42–52)
HGB BLD-MCNC: 12.4 G/DL (ref 14–18)
IMM GRANULOCYTES # BLD AUTO: 0.04 K/UL (ref 0–0.11)
IMM GRANULOCYTES NFR BLD AUTO: 0.5 % (ref 0–0.9)
LYMPHOCYTES # BLD AUTO: 3.77 K/UL (ref 1–4.8)
LYMPHOCYTES NFR BLD: 43.9 % (ref 22–41)
MCH RBC QN AUTO: 32.6 PG (ref 27–33)
MCHC RBC AUTO-ENTMCNC: 35.7 G/DL (ref 32.3–36.5)
MCV RBC AUTO: 91.3 FL (ref 81.4–97.8)
MONOCYTES # BLD AUTO: 0.62 K/UL (ref 0–0.85)
MONOCYTES NFR BLD AUTO: 7.2 % (ref 0–13.4)
NEUTROPHILS # BLD AUTO: 3.53 K/UL (ref 1.82–7.42)
NEUTROPHILS NFR BLD: 41.1 % (ref 44–72)
NRBC # BLD AUTO: 0 K/UL
NRBC BLD-RTO: 0 /100 WBC (ref 0–0.2)
PLATELET # BLD AUTO: 200 K/UL (ref 164–446)
PMV BLD AUTO: 10.4 FL (ref 9–12.9)
POTASSIUM SERPL-SCNC: 3.7 MMOL/L (ref 3.6–5.5)
PROT SERPL-MCNC: 6 G/DL (ref 6–8.2)
RBC # BLD AUTO: 3.8 M/UL (ref 4.7–6.1)
SODIUM SERPL-SCNC: 142 MMOL/L (ref 135–145)
TROPONIN T SERPL-MCNC: 6 NG/L (ref 6–19)
TROPONIN T SERPL-MCNC: 7 NG/L (ref 6–19)
WBC # BLD AUTO: 8.6 K/UL (ref 4.8–10.8)

## 2024-03-23 PROCEDURE — 80053 COMPREHEN METABOLIC PANEL: CPT

## 2024-03-23 PROCEDURE — 36415 COLL VENOUS BLD VENIPUNCTURE: CPT

## 2024-03-23 PROCEDURE — 93005 ELECTROCARDIOGRAM TRACING: CPT | Performed by: STUDENT IN AN ORGANIZED HEALTH CARE EDUCATION/TRAINING PROGRAM

## 2024-03-23 PROCEDURE — 84484 ASSAY OF TROPONIN QUANT: CPT | Mod: 91

## 2024-03-23 PROCEDURE — A9270 NON-COVERED ITEM OR SERVICE: HCPCS | Mod: UD | Performed by: STUDENT IN AN ORGANIZED HEALTH CARE EDUCATION/TRAINING PROGRAM

## 2024-03-23 PROCEDURE — 71045 X-RAY EXAM CHEST 1 VIEW: CPT

## 2024-03-23 PROCEDURE — 99284 EMERGENCY DEPT VISIT MOD MDM: CPT

## 2024-03-23 PROCEDURE — 93005 ELECTROCARDIOGRAM TRACING: CPT

## 2024-03-23 PROCEDURE — 85025 COMPLETE CBC W/AUTO DIFF WBC: CPT

## 2024-03-23 PROCEDURE — 700102 HCHG RX REV CODE 250 W/ 637 OVERRIDE(OP): Mod: UD | Performed by: STUDENT IN AN ORGANIZED HEALTH CARE EDUCATION/TRAINING PROGRAM

## 2024-03-23 RX ORDER — MECLIZINE HYDROCHLORIDE 25 MG/1
25 TABLET ORAL ONCE
Status: COMPLETED | OUTPATIENT
Start: 2024-03-23 | End: 2024-03-23

## 2024-03-23 RX ADMIN — MECLIZINE HYDROCHLORIDE 25 MG: 25 TABLET ORAL at 02:11

## 2024-03-23 ASSESSMENT — FIBROSIS 4 INDEX: FIB4 SCORE: 0.66

## 2024-03-23 ASSESSMENT — PAIN DESCRIPTION - DESCRIPTORS: DESCRIPTORS: ACHING

## 2024-03-23 NOTE — ED NOTES
Pt discharged . GCS 15. IV discontinued and gauze placed, pt in possession of belongings. Pt provided discharge education and information pertaining to medications and follow up appointments. Pt received copy of discharge instructions and verbalized understanding.     Vitals:    03/23/24 0400   BP: 129/70   Pulse: (!) 51   Resp: 12   Temp: 36.8 °C (98.2 °F)   SpO2: 96%

## 2024-03-23 NOTE — ED PROVIDER NOTES
ED Provider Note    CHIEF COMPLAINT  Chief Complaint   Patient presents with    Chest Pain    Dizziness       EXTERNAL RECORDS REVIEWED  Outpatient Notes outpatient cardiology follow-up in 2023 for CAD, erectile dysfunction, cardiovascular symptoms.    HPI/ROS  LIMITATION TO HISTORY   Select: : None  OUTSIDE HISTORIAN(S):  Law Enforcement      Forty Five Ginger is a 43 y.o. male who presents with episode of dizziness at about 1115.  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  While he was doing his duties he began feeling dizzy.  He felt the need to sit down.  He noted to triage that he had chest pain.  On my history gathering he noted no chest pain.  He does note his jaw had an unusual, torsion around this time.  He is on olanzapine for recent thoughts he reports and this is a relatively new medication.  He has had no further jaw symptoms but he has had 1 episode further of brief dizziness prior to arrival here.  He currently has no symptoms.  He has not had this happen before, no fevers, no ear symptoms, no recent illnesses, no cough.  Again no chest pain, had no shortness of breath, no diaphoresis nausea or vomiting.  Does note history of CAD with 1 stent in the remote past, compliant with his medications including aspirin and Plavix.    PAST MEDICAL HISTORY   has a past medical history of ASTHMA, Coronary artery disease involving native coronary artery of native heart with unstable angina pectoris (HCC) (2023), GERD (gastroesophageal reflux disease), Methamphetamine abuse (HCC) (3/16/2013), and Tobacco use.    SURGICAL HISTORY   has a past surgical history that includes tonsillectomy and adenoidectomy and other (comments).    FAMILY HISTORY  Family History   Problem Relation Age of Onset    Heart Disease Other         early heart disease in his 30s    Heart Disease Father     Heart Disease Paternal Uncle          of early heart disease in his 30s       SOCIAL HISTORY  Social  "History     Tobacco Use    Smoking status: Every Day     Current packs/day: 0.50     Types: Cigarettes, Cigars    Smokeless tobacco: Never    Tobacco comments:     Patient smokes 2 cigars/day. Has tendency to \"puff, puff, inhale\". Wants help w/ quitting, asking for varenicline.   Vaping Use    Vaping Use: Some days    Substances: Nicotine   Substance and Sexual Activity    Alcohol use: Not Currently     Comment: 2 beers every 2 days.    Drug use: Yes     Types: Inhaled, Methamphetamines     Comment: Quit meth use 3/2023. Prior hx of cocaine addiction, roughly 20 years ago.    Sexual activity: Yes     Partners: Female       CURRENT MEDICATIONS  Home Medications    **Home medications have not yet been reviewed for this encounter**         ALLERGIES  Allergies   Allergen Reactions    Benadryl [Altaryl] Unspecified     twitching    Dramamine [Dimenhydrinate] Unspecified     twitching    Oxycodone Nausea       PHYSICAL EXAM  VITAL SIGNS: /70   Pulse (!) 51   Temp 36.8 °C (98.2 °F) (Temporal)   Resp 12   Ht 1.798 m (5' 10.8\")   Wt 83.5 kg (184 lb 1.4 oz)   SpO2 96%   BMI 25.82 kg/m²    Constitutional: Awake and alert. No acute distress.  Head: NCAT.  HEENT: Normal Conjunctiva. PERRLA.  Neck: Grossly normal range of motion. Airway midline.  Cardiovascular: Normal heart rate, Normal rhythm.  Thorax & Lungs: No respiratory distress. Clear to Auscultation bilaterally.  Abdomen: Normal inspection. Nontender. Nondistended  Skin: No obvious rash.  Back: No tenderness, No CVA tenderness.   Musculoskeletal: No obvious deformity. Moves all extremities Well.  Neurologic: A&Ox3.  Ambulatory without ataxia.  Psychiatric: Mood and affect are appropriate for situation.    DIAGNOSTIC STUDIES / PROCEDURES  EKG  I have independently interpreted this EKG  Sinus bradycardia 55bpm.  Normal intervals.  No acute ST or T wave changes.    LABS  Results for orders placed or performed during the hospital encounter of 03/23/24   CBC " with Differential   Result Value Ref Range    WBC 8.6 4.8 - 10.8 K/uL    RBC 3.80 (L) 4.70 - 6.10 M/uL    Hemoglobin 12.4 (L) 14.0 - 18.0 g/dL    Hematocrit 34.7 (L) 42.0 - 52.0 %    MCV 91.3 81.4 - 97.8 fL    MCH 32.6 27.0 - 33.0 pg    MCHC 35.7 32.3 - 36.5 g/dL    RDW 39.5 35.9 - 50.0 fL    Platelet Count 200 164 - 446 K/uL    MPV 10.4 9.0 - 12.9 fL    Neutrophils-Polys 41.10 (L) 44.00 - 72.00 %    Lymphocytes 43.90 (H) 22.00 - 41.00 %    Monocytes 7.20 0.00 - 13.40 %    Eosinophils 6.50 0.00 - 6.90 %    Basophils 0.80 0.00 - 1.80 %    Immature Granulocytes 0.50 0.00 - 0.90 %    Nucleated RBC 0.00 0.00 - 0.20 /100 WBC    Neutrophils (Absolute) 3.53 1.82 - 7.42 K/uL    Lymphs (Absolute) 3.77 1.00 - 4.80 K/uL    Monos (Absolute) 0.62 0.00 - 0.85 K/uL    Eos (Absolute) 0.56 (H) 0.00 - 0.51 K/uL    Baso (Absolute) 0.07 0.00 - 0.12 K/uL    Immature Granulocytes (abs) 0.04 0.00 - 0.11 K/uL    NRBC (Absolute) 0.00 K/uL   Complete Metabolic Panel (CMP)   Result Value Ref Range    Sodium 142 135 - 145 mmol/L    Potassium 3.7 3.6 - 5.5 mmol/L    Chloride 109 96 - 112 mmol/L    Co2 22 20 - 33 mmol/L    Anion Gap 11.0 7.0 - 16.0    Glucose 85 65 - 99 mg/dL    Bun 15 8 - 22 mg/dL    Creatinine 0.97 0.50 - 1.40 mg/dL    Calcium 8.2 (L) 8.5 - 10.5 mg/dL    Correct Calcium 8.4 (L) 8.5 - 10.5 mg/dL    AST(SGOT) 16 12 - 45 U/L    ALT(SGPT) 19 2 - 50 U/L    Alkaline Phosphatase 64 30 - 99 U/L    Total Bilirubin 0.2 0.1 - 1.5 mg/dL    Albumin 3.8 3.2 - 4.9 g/dL    Total Protein 6.0 6.0 - 8.2 g/dL    Globulin 2.2 1.9 - 3.5 g/dL    A-G Ratio 1.7 g/dL   Troponins NOW   Result Value Ref Range    Troponin T 6 6 - 19 ng/L   Troponins in two (2) hours   Result Value Ref Range    Troponin T 7 6 - 19 ng/L   ESTIMATED GFR   Result Value Ref Range    GFR (CKD-EPI) 98 >60 mL/min/1.73 m 2   EKG   Result Value Ref Range    Report       [facility] Emergency Dept.    Test Date:  2024-03-23  Pt Name:    FORTY FIVE EDDENT             Department: ER  MRN:        [id number]                      Room:        16  Gender:     Male                         Technician: [id number]  :        1980                   Requested By:ER TRIAGE PROTOCOL  Order #:    [id number]                    Reading MD:    Measurements  Intervals                                Axis  Rate:       55                           P:          60  MA:         171                          QRS:        54  QRSD:       102                          T:          39  QT:         460  QTc:        440    Interpretive Statements  Sinus bradycardia  Abnormal R-wave progression, early transition  Compared to ECG 2023 12:38:02  T-wave abnormality no longer present           RADIOLOGY  I have independently interpreted the diagnostic imaging associated with this visit and am waiting the final reading from the radiologist.   My preliminary interpretation is as follows: no opacity, no effusion  Radiologist interpretation:   DX-CHEST-PORTABLE (1 VIEW)   Final Result      1.  No acute cardiac or pulmonary abnormalities are identified.            COURSE & MEDICAL DECISION MAKING    ED Observation Status? No; Patient does not meet criteria for ED Observation.     INITIAL ASSESSMENT, COURSE AND PLAN  Care Narrative:   43-year-old male history of CAD and compliant with his medications here for episode of dizziness while at [employer] facility doing his duties now with resolved symptoms.  Afebrile, bradycardia noted, appropriate blood pressure.  On exam no murmur, clear lungs, no distress.  Differential includes vertigo, ACS, infection, symptomatic bradycardia, arrhythmia, electrolyte disturbance.  EKG nonischemic, sinus bradycardia.  Chest x-ray no findings Labs notable for normocytic anemia, has records from  with similar findings.  Otherwise labs including troponins unremarkable.  Patient able to mount an appropriate heart rate with sitting up, standing and do not think bradycardia is the cause of  his dizziness nor warrants further investigation.  Patient did receive meclizine for dizziness.  He is feeling well.  We discussed fit for incarceration and medically cleared.  If persistent or worsening symptoms return for reevaluation.      ADDITIONAL PROBLEM LIST  None  DISPOSITION AND DISCUSSIONS  I have discussed management of the patient with the following physicians and REID's:  None    Discussion of management with other [facility] or appropriate source(s): None     Escalation of care considered, and ultimately not performed:blood analysis, diagnostic imaging, and acute inpatient care management, however at this time, the patient is most appropriate for outpatient management    Barriers to care at this time, including but not limited to:  None .     Decision tools and prescription drugs considered including, but not limited to: HEART Score 3 .    FINAL DIAGNOSIS  1. Dizziness    2. Normocytic anemia    3. Sinus bradycardia           Electronically signed by: Ronak Stewart D.O., 3/23/2024 2:08 AM

## 2024-03-23 NOTE — Clinical Note
Colt Correa was seen and treated in our emergency department on 3/23/2024.  He may return to work on 03/23/2024.  FIT FOR INCARCERATION     If you have any questions or concerns, please don't hesitate to call.      Ronak Stewart D.O. Positioning (Leave Blank If You Do Not Want): The patient was placed in a comfortable position exposing the surgical site.
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
## 2024-04-20 ENCOUNTER — HOSPITAL ENCOUNTER (EMERGENCY)
Facility: MEDICAL CENTER | Age: 44
End: 2024-04-20
Attending: EMERGENCY MEDICINE
Payer: COMMERCIAL

## 2024-04-20 ENCOUNTER — PHARMACY VISIT (OUTPATIENT)
Dept: PHARMACY | Facility: MEDICAL CENTER | Age: 44
End: 2024-04-20
Payer: COMMERCIAL

## 2024-04-20 VITALS
BODY MASS INDEX: 27.68 KG/M2 | OXYGEN SATURATION: 98 % | TEMPERATURE: 98.4 F | HEIGHT: 70 IN | DIASTOLIC BLOOD PRESSURE: 99 MMHG | WEIGHT: 193.34 LBS | SYSTOLIC BLOOD PRESSURE: 154 MMHG | HEART RATE: 80 BPM | RESPIRATION RATE: 16 BRPM

## 2024-04-20 DIAGNOSIS — Z76.0 MEDICATION REFILL: ICD-10-CM

## 2024-04-20 DIAGNOSIS — Z86.79 HISTORY OF CORONARY ARTERY DISEASE: ICD-10-CM

## 2024-04-20 DIAGNOSIS — I10 PRIMARY HYPERTENSION: ICD-10-CM

## 2024-04-20 DIAGNOSIS — Z86.59 HISTORY OF PSYCHOSIS: ICD-10-CM

## 2024-04-20 DIAGNOSIS — I25.110 CORONARY ARTERY DISEASE INVOLVING NATIVE CORONARY ARTERY OF NATIVE HEART WITH UNSTABLE ANGINA PECTORIS (HCC): ICD-10-CM

## 2024-04-20 DIAGNOSIS — Z95.820 S/P ANGIOPLASTY WITH STENT: ICD-10-CM

## 2024-04-20 PROCEDURE — 99282 EMERGENCY DEPT VISIT SF MDM: CPT

## 2024-04-20 PROCEDURE — RXMED WILLOW AMBULATORY MEDICATION CHARGE: Performed by: EMERGENCY MEDICINE

## 2024-04-20 RX ORDER — OLANZAPINE 5 MG/1
5 TABLET ORAL NIGHTLY
Qty: 30 TABLET | Refills: 0 | Status: SHIPPED | OUTPATIENT
Start: 2024-04-20

## 2024-04-20 RX ORDER — LISINOPRIL 2.5 MG/1
2.5 TABLET ORAL DAILY
Qty: 90 TABLET | Refills: 0 | Status: SHIPPED | OUTPATIENT
Start: 2024-04-20

## 2024-04-20 RX ORDER — ATORVASTATIN CALCIUM 20 MG/1
20 TABLET, FILM COATED ORAL DAILY
Qty: 90 TABLET | Refills: 0 | Status: SHIPPED | OUTPATIENT
Start: 2024-04-20

## 2024-04-20 RX ORDER — CLOPIDOGREL BISULFATE 75 MG/1
75 TABLET ORAL DAILY
Qty: 90 TABLET | Refills: 0 | Status: SHIPPED | OUTPATIENT
Start: 2024-04-20

## 2024-04-20 ASSESSMENT — FIBROSIS 4 INDEX: FIB4 SCORE: 0.81

## 2024-04-20 NOTE — ED PROVIDER NOTES
"ED Provider Note    CHIEF COMPLAINT  Chief Complaint   Patient presents with    Medication Refill     Patient reports out of medication 4 days after DC from senior living. Patient reports needs lisinopril, atorvastatin and ASA.        EXTERNAL RECORDS REVIEWED  2023, outpatient office visit, coronary artery disease, hallucinations and methamphetamine use    HPI/LITA      Colt Correa is a 43 y.o. male who presents requesting medication refill.  States that he was recently incarcerated, just got released and has run out of his blood pressure medication, his cholesterol medication, his antiplatelet medication and his antipsychotic medication.  He reports other than having high blood pressure right now he has no complaints, no chest pain or shortness of breath, no abdominal pain and no other physical complaints.    PAST MEDICAL HISTORY   has a past medical history of ASTHMA, Coronary artery disease involving native coronary artery of native heart with unstable angina pectoris (HCC) (2023), GERD (gastroesophageal reflux disease), Methamphetamine abuse ([facility]) (3/16/2013), and Tobacco use.    SURGICAL HISTORY   has a past surgical history that includes tonsillectomy and adenoidectomy and other (comments).    FAMILY HISTORY  Family History   Problem Relation Age of Onset    Heart Disease Other         early heart disease in his 30s    Heart Disease Father     Heart Disease Paternal Uncle          of early heart disease in his 30s       SOCIAL HISTORY  Social History     Tobacco Use    Smoking status: Former     Current packs/day: 0.50     Types: Cigarettes, Cigars    Smokeless tobacco: Never    Tobacco comments:     Patient smokes 2 cigars/day. Has tendency to \"puff, puff, inhale\". Wants help w/ quitting, asking for varenicline.   Vaping Use    Vaping Use: Some days    Substances: Nicotine   Substance and Sexual Activity    Alcohol use: Not Currently     Comment: 2 beers every 2 days.    Drug use: Yes     " "Types: Inhaled, Methamphetamines     Comment: Quit meth use 3/2023. Prior hx of cocaine addiction, roughly 20 years ago.    Sexual activity: Yes     Partners: Female       CURRENT MEDICATIONS  Home Medications       Reviewed by Julia Melton R.N. (Registered Nurse) on 04/20/24 at 1155  Med List Status: Partial     Medication Last Dose Status   acetaminophen (TYLENOL) 500 MG Tab  Active   aspirin 81 MG EC tablet  Active   atorvastatin (LIPITOR) 20 MG Tab  Active   clopidogrel (PLAVIX) 75 MG Tab  Active   diclofenac sodium (VOLTAREN) 1 % Gel  Active   lisinopril (PRINIVIL) 2.5 MG Tab  Active   metoprolol tartrate (LOPRESSOR) 25 MG Tab  Active   nicotine polacrilex (NICORETTE) 2 MG Gum  Active                    ALLERGIES  Allergies   Allergen Reactions    Benadryl [Altaryl] Unspecified     twitching    Dramamine [Dimenhydrinate] Unspecified     twitching    Oxycodone Nausea       PHYSICAL EXAM  VITAL SIGNS: BP (!) 154/92   Pulse 67   Temp 37 °C (98.6 °F) (Temporal)   Resp 20   Ht 1.778 m (5' 10\")   Wt 87.7 kg (193 lb 5.5 oz)   SpO2 97%   BMI 27.74 kg/m²    Constitutional: Alert in no apparent distress.  HENT: No signs of significant acute trauma.   Eyes: Conjunctiva normal, non-icteric.   Chest: Normal nonlabored respirations  Skin: No appreciable rash on the exposed skin  Musculoskeletal: No obvious acute trauma appreciated  Neurologic: Alert, no obvious focal deficits noted.        COURSE & MEDICAL DECISION MAKING    ASSESSMENT, COURSE AND PLAN  Care Narrative: 43-year-old male just got out of [facility], comes in requesting medication refill.  History of coronary disease with MI, has not been on his Plavix or statin or blood pressure medication.  He also requests a refill of his olanzapine, has a history of hallucinations.  These prescriptions have been sent to our pharmacy here at this hospital the patient reports he is homeless.  Discharged home in stable condition      DISPOSITION AND DISCUSSIONS    Barriers " to care at this time, including but not limited to: Patient is homeless.       FINAL DIAGNOSIS  1. Medication refill    2. Primary hypertension    3. History of coronary artery disease    4. S/P angioplasty with stent    5. Coronary artery disease involving native coronary artery of native heart with unstable angina pectoris (HCC)    6. History of psychosis           Electronically signed by: Isak Smith M.D., 4/20/2024 1:32 PM

## 2024-04-20 NOTE — ED TRIAGE NOTES
Chief Complaint   Patient presents with    Medication Refill     Patient reports out of medication 4 days after DC from retirement. Patient reports needs lisinopril, atorvastatin and ASA.

## 2024-05-14 PROCEDURE — RXMED WILLOW AMBULATORY MEDICATION CHARGE: Performed by: EMERGENCY MEDICINE

## 2024-05-15 ENCOUNTER — PHARMACY VISIT (OUTPATIENT)
Dept: PHARMACY | Facility: MEDICAL CENTER | Age: 44
End: 2024-05-15
Payer: COMMERCIAL

## 2024-05-18 ENCOUNTER — PHARMACY VISIT (OUTPATIENT)
Dept: PHARMACY | Facility: MEDICAL CENTER | Age: 44
End: 2024-05-18
Payer: COMMERCIAL

## 2024-05-18 PROCEDURE — RXMED WILLOW AMBULATORY MEDICATION CHARGE: Performed by: PHYSICIAN ASSISTANT

## 2024-05-18 RX ORDER — OLANZAPINE 5 MG/1
TABLET ORAL
Qty: 30 TABLET | Refills: 0 | OUTPATIENT
Start: 2024-05-18

## 2024-05-21 DIAGNOSIS — I25.110 CORONARY ARTERY DISEASE INVOLVING NATIVE CORONARY ARTERY OF NATIVE HEART WITH UNSTABLE ANGINA PECTORIS (HCC): ICD-10-CM

## 2024-07-05 ENCOUNTER — TELEPHONE (OUTPATIENT)
Dept: HEALTH INFORMATION MANAGEMENT | Facility: OTHER | Age: 44
End: 2024-07-05
Payer: COMMERCIAL

## 2024-08-13 PROCEDURE — RXMED WILLOW AMBULATORY MEDICATION CHARGE: Performed by: STUDENT IN AN ORGANIZED HEALTH CARE EDUCATION/TRAINING PROGRAM

## 2024-08-21 ENCOUNTER — PHARMACY VISIT (OUTPATIENT)
Dept: PHARMACY | Facility: MEDICAL CENTER | Age: 44
End: 2024-08-21
Payer: COMMERCIAL

## 2025-04-22 NOTE — ASSESSMENT & PLAN NOTE
Colt presents today to establish care. He reports a history of multiple moles located on his thorax. He does not perform at home skin checks regularly. He would like to be evaluated for atypical moles and referred to dermatology for further evaluation.    Patent